# Patient Record
Sex: FEMALE | Race: BLACK OR AFRICAN AMERICAN | NOT HISPANIC OR LATINO | URBAN - METROPOLITAN AREA
[De-identification: names, ages, dates, MRNs, and addresses within clinical notes are randomized per-mention and may not be internally consistent; named-entity substitution may affect disease eponyms.]

---

## 2023-03-20 ENCOUNTER — NURSING HOME VISIT (OUTPATIENT)
Dept: POST ACUTE CARE | Facility: EXTERNAL LOCATION | Age: 88
End: 2023-03-20
Payer: MEDICARE

## 2023-03-20 ENCOUNTER — NURSING HOME VISIT (OUTPATIENT)
Dept: POST ACUTE CARE | Facility: EXTERNAL LOCATION | Age: 88
End: 2023-03-20

## 2023-03-20 DIAGNOSIS — M15.9 PRIMARY OSTEOARTHRITIS INVOLVING MULTIPLE JOINTS: ICD-10-CM

## 2023-03-20 DIAGNOSIS — K81.1 CHRONIC CHOLECYSTITIS: Primary | ICD-10-CM

## 2023-03-20 DIAGNOSIS — I10 HYPERTENSION, ESSENTIAL: ICD-10-CM

## 2023-03-20 DIAGNOSIS — F02.A0 MILD LATE ONSET ALZHEIMER'S DEMENTIA WITHOUT BEHAVIORAL DISTURBANCE, PSYCHOTIC DISTURBANCE, MOOD DISTURBANCE, OR ANXIETY (MULTI): ICD-10-CM

## 2023-03-20 DIAGNOSIS — E11.9 TYPE 2 DIABETES MELLITUS WITHOUT COMPLICATION, WITHOUT LONG-TERM CURRENT USE OF INSULIN (MULTI): ICD-10-CM

## 2023-03-20 DIAGNOSIS — R53.1 WEAKNESS: Primary | ICD-10-CM

## 2023-03-20 DIAGNOSIS — G30.1 MILD LATE ONSET ALZHEIMER'S DEMENTIA WITHOUT BEHAVIORAL DISTURBANCE, PSYCHOTIC DISTURBANCE, MOOD DISTURBANCE, OR ANXIETY (MULTI): ICD-10-CM

## 2023-03-20 DIAGNOSIS — K21.9 GASTROESOPHAGEAL REFLUX DISEASE WITHOUT ESOPHAGITIS: ICD-10-CM

## 2023-03-20 DIAGNOSIS — I50.32 CHRONIC DIASTOLIC HEART FAILURE (MULTI): ICD-10-CM

## 2023-03-20 DIAGNOSIS — N18.31 STAGE 3A CHRONIC KIDNEY DISEASE (MULTI): ICD-10-CM

## 2023-03-20 DIAGNOSIS — K81.1 CHRONIC CHOLECYSTITIS: ICD-10-CM

## 2023-03-20 DIAGNOSIS — R11.2 NAUSEA AND VOMITING IN ADULT: ICD-10-CM

## 2023-03-20 PROCEDURE — 99305 1ST NF CARE MODERATE MDM 35: CPT | Performed by: INTERNAL MEDICINE

## 2023-03-20 NOTE — LETTER
Patient: Gwen Fowler  : 1930    Encounter Date: 2023    History and Physical  Subjective  Chief complaint: Gwen Fowler is a 89 y.o. female who is a acute skilled care patient being seen and evaluated for multiple medical problems.  Patient presents for weakness    HPI:  patient admitted to SNF for therapy d/t weakness after recent hospitalization s/p fall.  During hospitalization patient had nausea vomiting and abdominal pain.  HIDA scan was performed and showed chronic cholecystitis and patient underwent laparoscopic cholecystectomy.  She had some left knee pain and underwent aspiration to rule out septic arthritis and recommendation was made for further work-up as outpatient.  She was given a left lower extremity immobilizer.  She states that she continues to have pain in multiple joints which is not new.  Pain medication is somewhat effective.  Patient has been working in therapy and is working on transfers.  She requires maximum assist for stand pivot transfer and maximum assist for sit to stand at front wheeled walker.             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  No family history of gallbladder issue  Social History     Socioeconomic History   • Marital status:      Spouse name: Not on file   • Number of children: Not on file   • Years of education: Not on file   • Highest education level: Not on file   Occupational History   • Not on file   Tobacco Use   • Smoking status: Not on file   • Smokeless tobacco: Not on file   Vaping Use   • Vaping  status: Not on file   Substance and Sexual Activity   • Alcohol use: Not on file   • Drug use: Not on file   • Sexual activity: Not on file   Other Topics Concern   • Not on file   Social History Narrative   • Not on file     Social Determinants of Health     Financial Resource Strain: Not on file   Food Insecurity: Not on file   Transportation Needs: Not on file   Physical Activity: Not on file   Stress: Not on file   Social Connections: Not on file   Intimate Partner Violence: Not on file   Housing Stability: Not on file     Social history does not smoke or drink present time  Vital signs: 140/70-75-17    Objective  Physical Exam  Vitals reviewed.   Constitutional:       Appearance: Normal appearance.   HENT:      Head: Normocephalic and atraumatic.   Cardiovascular:      Rate and Rhythm: Normal rate and regular rhythm.   Pulmonary:      Effort: Pulmonary effort is normal.      Breath sounds: Normal breath sounds.   Abdominal:      General: Bowel sounds are normal.      Palpations: Abdomen is soft.   Musculoskeletal:      Cervical back: Neck supple.      Right lower le+ Pitting Edema present.      Left lower le+ Pitting Edema present.   Skin:     General: Skin is warm and dry.   Neurological:      General: No focal deficit present.      Mental Status: She is alert.   Psychiatric:         Mood and Affect: Mood normal.         Behavior: Behavior is cooperative.         Assessment/Plan  Problem List Items Addressed This Visit          Nervous    Mild late onset Alzheimer's dementia without behavioral disturbance, psychotic disturbance, mood disturbance, or anxiety (CMS/HCC)       Circulatory    Chronic diastolic heart failure (CMS/HCC)    Hypertension, essential       Digestive    Chronic cholecystitis - Primary    Nausea and vomiting in adult    GERD (gastroesophageal reflux disease)       Genitourinary    Stage 3a chronic kidney disease       Musculoskeletal    Primary osteoarthritis involving multiple  joints     Medications, treatments, and labs reviewed  Continue medications and treatments as listed in PCC    Keely Braden MD      Electronically Signed By: Keely Braden MD   3/25/23  3:23 PM

## 2023-03-20 NOTE — LETTER
Patient: Gwen Fowler  : 1930    Encounter Date: 2023    History and Physical  Subjective  Chief complaint: Gwen Fowler is a 89 y.o. female who is a acute skilled care patient being seen and evaluated for multiple medical problems.  Patient presents for weakness    HPI:  patient admitted to SNF for therapy d/t weakness after recent hospitalization s/p fall.  During hospitalization patient had nausea vomiting and abdominal pain.  HIDA scan was performed and showed chronic cholecystitis and patient underwent laparoscopic cholecystectomy.  She had some left knee pain and underwent aspiration to rule out septic arthritis and recommendation was made for further work-up as outpatient.  She was given a left lower extremity immobilizer.  She states that she continues to have pain in multiple joints which is not new.  Pain medication is somewhat effective.  Patient has been working in therapy and is working on transfers.  She requires maximum assist for stand pivot transfer and maximum assist for sit to stand at front wheeled walker.             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  No family history of gallbladder issue  Social History     Socioeconomic History   • Marital status:      Spouse name: Not on file   • Number of children: Not on file   • Years of education: Not on file   • Highest education level: Not on file   Occupational History   • Not on file   Tobacco Use   • Smoking status: Not on file   • Smokeless tobacco: Not on file   Vaping Use   • Vaping  status: Not on file   Substance and Sexual Activity   • Alcohol use: Not on file   • Drug use: Not on file   • Sexual activity: Not on file   Other Topics Concern   • Not on file   Social History Narrative   • Not on file     Social Determinants of Health     Financial Resource Strain: Not on file   Food Insecurity: Not on file   Transportation Needs: Not on file   Physical Activity: Not on file   Stress: Not on file   Social Connections: Not on file   Intimate Partner Violence: Not on file   Housing Stability: Not on file     Social history does not smoke or drink present time  Vital signs: 140/70-75-17    Objective  Physical Exam  Vitals reviewed.   Constitutional:       Appearance: Normal appearance.   HENT:      Head: Normocephalic and atraumatic.   Cardiovascular:      Rate and Rhythm: Normal rate and regular rhythm.   Pulmonary:      Effort: Pulmonary effort is normal.      Breath sounds: Normal breath sounds.   Abdominal:      General: Bowel sounds are normal.      Palpations: Abdomen is soft.   Musculoskeletal:      Cervical back: Neck supple.      Right lower le+ Pitting Edema present.      Left lower le+ Pitting Edema present.   Skin:     General: Skin is warm and dry.   Neurological:      General: No focal deficit present.      Mental Status: She is alert.   Psychiatric:         Mood and Affect: Mood normal.         Behavior: Behavior is cooperative.         Assessment/Plan  Problem List Items Addressed This Visit          Nervous    Mild late onset Alzheimer's dementia without behavioral disturbance, psychotic disturbance, mood disturbance, or anxiety (CMS/HCC)       Circulatory    Chronic diastolic heart failure (CMS/HCC)     Diuretic  Monitor weight         Hypertension, essential     Continue antihypertensives  Continue to monitor blood pressure            Digestive    Chronic cholecystitis     Status post laparoscopic cholecystectomy            Endocrine/Metabolic    Type 2 diabetes mellitus  without complication, without long-term current use of insulin (CMS/[facility])     Monitor BS  Continue current meds            Other    Weakness - Primary     Continue therapy        Medications, treatments, and labs reviewed  Continue medications and treatments as listed in PCC    Keely Braden MD      Electronically Signed By: Keely Braden MD   3/25/23  4:52 PM

## 2023-03-21 ENCOUNTER — NURSING HOME VISIT (OUTPATIENT)
Dept: POST ACUTE CARE | Facility: EXTERNAL LOCATION | Age: 88
End: 2023-03-21
Payer: MEDICARE

## 2023-03-21 DIAGNOSIS — I10 HYPERTENSION, ESSENTIAL: Primary | ICD-10-CM

## 2023-03-21 DIAGNOSIS — F32.A DEPRESSION, UNSPECIFIED DEPRESSION TYPE: ICD-10-CM

## 2023-03-21 DIAGNOSIS — M15.9 PRIMARY OSTEOARTHRITIS INVOLVING MULTIPLE JOINTS: ICD-10-CM

## 2023-03-21 DIAGNOSIS — R53.1 WEAKNESS: ICD-10-CM

## 2023-03-21 PROCEDURE — 99308 SBSQ NF CARE LOW MDM 20: CPT | Performed by: INTERNAL MEDICINE

## 2023-03-21 NOTE — LETTER
Patient: Gwen Fowler  : 1930    Encounter Date: 2023    Subjective  Chief complaint: Gwen Fowler is a 89 y.o. female who is a acute skilled care  presents for weakness.  HPI:  Patient presents for weakness.  Patient examined today at bedside.  Patient continues working with physical and occupational therapy.  Patient denies any pain or discomfort.  Therapy reports that patient is currently working on bed mobility, transfers, and therapeutic exercises.  Staff reports no new issues.  No acute distress.        Review of Systems  All systems reviewed and negative except for what was mentioned in the HPI    Vital signs:139/75, 97.2, 73, 18    Objective  Physical Exam  Constitutional:       General: She is not in acute distress.  Eyes:      Extraocular Movements: Extraocular movements intact.   Cardiovascular:      Rate and Rhythm: Regular rhythm.   Pulmonary:      Effort: Pulmonary effort is normal.      Breath sounds: Normal breath sounds.   Abdominal:      General: Bowel sounds are normal.      Palpations: Abdomen is soft.   Musculoskeletal:      Cervical back: Neck supple.      Right lower leg: No edema.      Left lower leg: No edema.   Neurological:      Mental Status: She is alert.   Psychiatric:         Mood and Affect: Mood normal.         Behavior: Behavior is cooperative.         Assessment/Plan  Problem List Items Addressed This Visit       Depression     Mood is stable.  Continue Seroquel.  Monitor for changes in behavior.         Hypertension, essential - Primary     Continue to monitor blood pressure.  Continue antihypertensive meds.         Primary osteoarthritis involving multiple joints     Pain is controlled.  Continue PT OT.         Weakness     Positive for weakness.  Continue therapy.          Medications, treatments, and labs reviewed  Continue medications and treatments as listed in [facility]    Scribe Attestation  I, Noe fox   attest that this documentation has been  prepared under the direction and in the presence of Keely Braden MD.    Provider Attestation - Scribe documentation  All medical record entries made by the Scribe were at my direction and personally dictated by me. I have reviewed the chart and agree that the record accurately reflects my personal performance of the history, physical exam, discussion and plan.        Electronically Signed By: Keely Braden MD   3/30/23  6:24 PM

## 2023-03-22 ENCOUNTER — NURSING HOME VISIT (OUTPATIENT)
Dept: POST ACUTE CARE | Facility: EXTERNAL LOCATION | Age: 88
End: 2023-03-22
Payer: MEDICARE

## 2023-03-22 DIAGNOSIS — R53.1 WEAKNESS: Primary | ICD-10-CM

## 2023-03-22 DIAGNOSIS — I10 HYPERTENSION, ESSENTIAL: ICD-10-CM

## 2023-03-22 DIAGNOSIS — I50.32 CHRONIC DIASTOLIC HEART FAILURE (MULTI): ICD-10-CM

## 2023-03-22 DIAGNOSIS — G30.1 MILD LATE ONSET ALZHEIMER'S DEMENTIA WITHOUT BEHAVIORAL DISTURBANCE, PSYCHOTIC DISTURBANCE, MOOD DISTURBANCE, OR ANXIETY (MULTI): ICD-10-CM

## 2023-03-22 DIAGNOSIS — N18.31 STAGE 3A CHRONIC KIDNEY DISEASE (MULTI): ICD-10-CM

## 2023-03-22 DIAGNOSIS — K81.1 CHRONIC CHOLECYSTITIS: ICD-10-CM

## 2023-03-22 DIAGNOSIS — F02.A0 MILD LATE ONSET ALZHEIMER'S DEMENTIA WITHOUT BEHAVIORAL DISTURBANCE, PSYCHOTIC DISTURBANCE, MOOD DISTURBANCE, OR ANXIETY (MULTI): ICD-10-CM

## 2023-03-22 PROBLEM — E11.9 TYPE 2 DIABETES MELLITUS WITHOUT COMPLICATION, WITHOUT LONG-TERM CURRENT USE OF INSULIN (MULTI): Status: ACTIVE | Noted: 2023-03-22

## 2023-03-22 PROBLEM — M15.9 PRIMARY OSTEOARTHRITIS INVOLVING MULTIPLE JOINTS: Status: ACTIVE | Noted: 2023-03-22

## 2023-03-22 PROCEDURE — 99309 SBSQ NF CARE MODERATE MDM 30: CPT | Performed by: NURSE PRACTITIONER

## 2023-03-22 NOTE — PROGRESS NOTES
Progress Note  Subjective   Chief complaint: Gwen Fowler is a 89 y.o. female who is a acute skilled care patient being seen and evaluated for weakness    HPI:  3/22/23 patient admitted to SNF for therapy d/t weakness after recent hospitalization s/p fall.  During hospitalization patient had nausea vomiting and abdominal pain.  HIDA scan was performed and showed chronic cholecystitis and patient underwent laparoscopic cholecystectomy.  She had some left knee pain and underwent aspiration to rule out septic arthritis and recommendation was made for further work-up as outpatient.  She was given a left lower extremity immobilizer.  She states that she continues to have pain in multiple joints which is not new.  Pain medication is somewhat effective.  Patient has been working in therapy and is working on transfers.  She requires maximum assist for stand pivot transfer and maximum assist for sit to stand at front wheeled walker.      Objective   Vital signs: 133/79, 98.0, 83, 18, 96%  Physical Exam  Constitutional:       General: She is not in acute distress.  Eyes:      Extraocular Movements: Extraocular movements intact.   Cardiovascular:      Rate and Rhythm: Regular rhythm.   Pulmonary:      Effort: Pulmonary effort is normal.      Breath sounds: Normal breath sounds.   Abdominal:      General: Bowel sounds are normal.      Palpations: Abdomen is soft.      Comments: Abdomen tender to palpation    Musculoskeletal:      Cervical back: Neck supple.      Right lower leg: Edema present.      Left lower leg: Edema present.      Comments: Generalized weakness  LLE immobilizer    Skin:     Comments: Lap site at umbilicus with small dried blood   Neurological:      Mental Status: She is alert.   Psychiatric:         Mood and Affect: Mood normal.         Behavior: Behavior is cooperative.         Assessment/Plan   Problem List Items Addressed This Visit       Chronic cholecystitis     Status post laparoscopic  cholecystectomy         Chronic diastolic heart failure (CMS/[facility])     Diuretic  Monitor weight         Hypertension, essential     Controlled  Continue antihypertensives  Continue to monitor blood pressure         Mild late onset Alzheimer's dementia without behavioral disturbance, psychotic disturbance, mood disturbance, or anxiety (CMS/[facility])    Stage 3a chronic kidney disease     Monitor lab         Weakness - Primary     Continue therapy          Medications, treatments, and labs reviewed  Continue medications and treatments as listed in PCC    Tami Ga, APRN-CNP

## 2023-03-22 NOTE — LETTER
Patient: Gwen Fowler  : 1930    Encounter Date: 2023    Progress Note  Subjective  Chief complaint: Gwen Fowler is a 89 y.o. female who is a acute skilled care patient being seen and evaluated for weakness    HPI:  3/22/23 patient admitted to SNF for therapy d/t weakness after recent hospitalization s/p fall.  During hospitalization patient had nausea vomiting and abdominal pain.  HIDA scan was performed and showed chronic cholecystitis and patient underwent laparoscopic cholecystectomy.  She had some left knee pain and underwent aspiration to rule out septic arthritis and recommendation was made for further work-up as outpatient.  She was given a left lower extremity immobilizer.  She states that she continues to have pain in multiple joints which is not new.  Pain medication is somewhat effective.  Patient has been working in therapy and is working on transfers.  She requires maximum assist for stand pivot transfer and maximum assist for sit to stand at front wheeled walker.      Objective  Vital signs: 133/79, 98.0, 83, 18, 96%  Physical Exam  Constitutional:       General: She is not in acute distress.  Eyes:      Extraocular Movements: Extraocular movements intact.   Cardiovascular:      Rate and Rhythm: Regular rhythm.   Pulmonary:      Effort: Pulmonary effort is normal.      Breath sounds: Normal breath sounds.   Abdominal:      General: Bowel sounds are normal.      Palpations: Abdomen is soft.      Comments: Abdomen tender to palpation    Musculoskeletal:      Cervical back: Neck supple.      Right lower leg: Edema present.      Left lower leg: Edema present.      Comments: Generalized weakness  LLE immobilizer    Skin:     Comments: Lap site at umbilicus with small dried blood   Neurological:      Mental Status: She is alert.   Psychiatric:         Mood and Affect: Mood normal.         Behavior: Behavior is cooperative.         Assessment/Plan  Problem List Items Addressed This Visit        Chronic cholecystitis     Status post laparoscopic cholecystectomy         Chronic diastolic heart failure (CMS/[facility])     Diuretic  Monitor weight         Hypertension, essential     Controlled  Continue antihypertensives  Continue to monitor blood pressure         Mild late onset Alzheimer's dementia without behavioral disturbance, psychotic disturbance, mood disturbance, or anxiety (CMS/[facility])    Stage 3a chronic kidney disease     Monitor lab         Weakness - Primary     Continue therapy          Medications, treatments, and labs reviewed  Continue medications and treatments as listed in PCC    KAIDEN Banda      Electronically Signed By: KAIDEN Banda   3/22/23  1:16 PM

## 2023-03-23 ENCOUNTER — NURSING HOME VISIT (OUTPATIENT)
Dept: POST ACUTE CARE | Facility: EXTERNAL LOCATION | Age: 88
End: 2023-03-23
Payer: MEDICARE

## 2023-03-23 DIAGNOSIS — E11.9 TYPE 2 DIABETES MELLITUS WITHOUT COMPLICATION, WITHOUT LONG-TERM CURRENT USE OF INSULIN (MULTI): ICD-10-CM

## 2023-03-23 DIAGNOSIS — R53.1 WEAKNESS: ICD-10-CM

## 2023-03-23 PROCEDURE — 99308 SBSQ NF CARE LOW MDM 20: CPT | Performed by: INTERNAL MEDICINE

## 2023-03-23 NOTE — PROGRESS NOTES
Progress Note  Subjective   Chief complaint: Gwen Fowler is a 89 y.o. female who is a acute skilled care patient being seen and evaluated for weakness    HPI:  3/22/23 patient admitted to SNF for therapy d/t weakness after recent hospitalization s/p fall.  During hospitalization patient had nausea vomiting and abdominal pain.  HIDA scan was performed and showed chronic cholecystitis and patient underwent laparoscopic cholecystectomy.  She had some left knee pain and underwent aspiration to rule out septic arthritis and recommendation was made for further work-up as outpatient.  She was given a left lower extremity immobilizer.  She states that she continues to have pain in multiple joints which is not new.  Pain medication is somewhat effective.  Patient has been working in therapy and is working on transfers.  She requires maximum assist for stand pivot transfer and maximum assist for sit to stand at front wheeled walker.    3/23/23 patient continues to work in therapy and is working on safe transfers.  She requires maximum assist for transfers maximum assist for sit to stand at front wheeled walker.  No new concerns or complaints.  No acute distress.      Objective   Vital signs: 134/75, 90, 20, Bs 83, 96%  Physical Exam  Constitutional:       General: She is not in acute distress.  Eyes:      Extraocular Movements: Extraocular movements intact.   Cardiovascular:      Rate and Rhythm: Regular rhythm.   Pulmonary:      Effort: Pulmonary effort is normal.      Breath sounds: Normal breath sounds.   Abdominal:      General: Bowel sounds are normal.      Palpations: Abdomen is soft.      Comments: Abdomen tender to palpation    Musculoskeletal:      Cervical back: Neck supple.      Right lower leg: Edema present.      Left lower leg: Edema present.      Comments: Generalized weakness  LLE immobilizer    Skin:     Comments: Lap site at umbilicus with small dried blood   Neurological:      Mental Status: She is  alert.   Psychiatric:         Mood and Affect: Mood normal.         Behavior: Behavior is cooperative.         Assessment/Plan   Problem List Items Addressed This Visit          Endocrine/Metabolic    Type 2 diabetes mellitus without complication, without long-term current use of insulin (CMS/[facility])     Monitor BS  Continue current meds            Other    Weakness     Continue therapy        Medications, treatments, and labs reviewed  Continue medications and treatments as listed in PCC    Keely Braden MD    Review of Systems

## 2023-03-23 NOTE — LETTER
Patient: Gwen Fowler  : 1930    Encounter Date: 2023    Progress Note  Subjective  Chief complaint: Gwen Fowler is a 89 y.o. female who is a acute skilled care patient being seen and evaluated for weakness    HPI:  3/22/23 patient admitted to SNF for therapy d/t weakness after recent hospitalization s/p fall.  During hospitalization patient had nausea vomiting and abdominal pain.  HIDA scan was performed and showed chronic cholecystitis and patient underwent laparoscopic cholecystectomy.  She had some left knee pain and underwent aspiration to rule out septic arthritis and recommendation was made for further work-up as outpatient.  She was given a left lower extremity immobilizer.  She states that she continues to have pain in multiple joints which is not new.  Pain medication is somewhat effective.  Patient has been working in therapy and is working on transfers.  She requires maximum assist for stand pivot transfer and maximum assist for sit to stand at front wheeled walker.    3/23/23 patient continues to work in therapy and is working on safe transfers.  She requires maximum assist for transfers maximum assist for sit to stand at front wheeled walker.  No new concerns or complaints.  No acute distress.      Objective  Vital signs: 134/75, 90, 20, Bs 83, 96%  Physical Exam  Constitutional:       General: She is not in acute distress.  Eyes:      Extraocular Movements: Extraocular movements intact.   Cardiovascular:      Rate and Rhythm: Regular rhythm.   Pulmonary:      Effort: Pulmonary effort is normal.      Breath sounds: Normal breath sounds.   Abdominal:      General: Bowel sounds are normal.      Palpations: Abdomen is soft.      Comments: Abdomen tender to palpation    Musculoskeletal:      Cervical back: Neck supple.      Right lower leg: Edema present.      Left lower leg: Edema present.      Comments: Generalized weakness  LLE immobilizer    Skin:     Comments: Lap site at umbilicus  with small dried blood   Neurological:      Mental Status: She is alert.   Psychiatric:         Mood and Affect: Mood normal.         Behavior: Behavior is cooperative.         Assessment/Plan  Problem List Items Addressed This Visit          Endocrine/Metabolic    Type 2 diabetes mellitus without complication, without long-term current use of insulin (CMS/[facility])     Monitor BS  Continue current meds            Other    Weakness     Continue therapy        Medications, treatments, and labs reviewed  Continue medications and treatments as listed in PCC    Keely Braden MD    Review of Systems      Electronically Signed By: Keely Braden MD   3/23/23  7:20 PM

## 2023-03-24 ENCOUNTER — NURSING HOME VISIT (OUTPATIENT)
Dept: POST ACUTE CARE | Facility: EXTERNAL LOCATION | Age: 88
End: 2023-03-24
Payer: MEDICARE

## 2023-03-24 DIAGNOSIS — K21.9 GASTROESOPHAGEAL REFLUX DISEASE WITHOUT ESOPHAGITIS: ICD-10-CM

## 2023-03-24 DIAGNOSIS — I10 HYPERTENSION, ESSENTIAL: ICD-10-CM

## 2023-03-24 DIAGNOSIS — R11.2 NAUSEA AND VOMITING IN ADULT: ICD-10-CM

## 2023-03-24 DIAGNOSIS — I50.32 CHRONIC DIASTOLIC HEART FAILURE (MULTI): ICD-10-CM

## 2023-03-24 DIAGNOSIS — K81.1 CHRONIC CHOLECYSTITIS: ICD-10-CM

## 2023-03-24 DIAGNOSIS — E11.9 TYPE 2 DIABETES MELLITUS WITHOUT COMPLICATION, WITHOUT LONG-TERM CURRENT USE OF INSULIN (MULTI): ICD-10-CM

## 2023-03-24 DIAGNOSIS — R53.1 WEAKNESS: Primary | ICD-10-CM

## 2023-03-24 PROCEDURE — 99309 SBSQ NF CARE MODERATE MDM 30: CPT | Performed by: NURSE PRACTITIONER

## 2023-03-24 NOTE — LETTER
Patient: Gwen Fowler  : 1930    Encounter Date: 2023    Progress Note  Subjective  Chief complaint: Gwen Fowler is a 89 y.o. female who is a acute skilled care patient being seen and evaluated for weakness and nausea and vomiting    HPI:  3/22/23 patient admitted to SNF for therapy d/t weakness after recent hospitalization s/p fall.  During hospitalization patient had nausea vomiting and abdominal pain.  HIDA scan was performed and showed chronic cholecystitis and patient underwent laparoscopic cholecystectomy.  She had some left knee pain and underwent aspiration to rule out septic arthritis and recommendation was made for further work-up as outpatient.  She was given a left lower extremity immobilizer.  She states that she continues to have pain in multiple joints which is not new.  Pain medication is somewhat effective.  Patient has been working in therapy and is working on transfers.  She requires maximum assist for stand pivot transfer and maximum assist for sit to stand at front wheeled walker.    3/23/23 patient continues to work in therapy and is working on safe transfers.  She requires maximum assist for transfers maximum assist for sit to stand at front wheeled walker.  No new concerns or complaints.  No acute distress.    3/24/2023 patient continues working in therapy.  She requires moderate assist for sit to stand transfers.  She is working in speech therapy as well.  Nurse reporting patient with nausea and vomiting after meals.  She was followed by GI in the hospital and treated with IV Protonix and IV Zofran.  Patient is currently on PPI.  She is having bowel movements according to the nurse.      Objective  Vital signs: 154/77, 97.3, 81, 96%, 20  Physical Exam  Constitutional:       General: She is not in acute distress.  Eyes:      Extraocular Movements: Extraocular movements intact.   Cardiovascular:      Rate and Rhythm: Regular rhythm.   Pulmonary:      Effort: Pulmonary  effort is normal.      Breath sounds: Normal breath sounds.   Abdominal:      General: Bowel sounds are normal.      Palpations: Abdomen is soft.      Comments: Abdomen tender to palpation    Musculoskeletal:      Cervical back: Neck supple.      Right lower leg: Edema present.      Left lower leg: Edema present.      Comments: Generalized weakness  LLE immobilizer    Skin:     Comments: Lap site at umbilicus with small dried blood   Neurological:      Mental Status: She is alert.   Psychiatric:         Mood and Affect: Mood normal.         Behavior: Behavior is cooperative.         Assessment/Plan  Problem List Items Addressed This Visit       Chronic cholecystitis     Status post laparoscopic cholecystectomy         Chronic diastolic heart failure (CMS/HCC)     Diuretic  Monitor weight         GERD (gastroesophageal reflux disease)     Continue PPI and start Pepcid         Hypertension, essential     Continue antihypertensives  Continue to monitor blood pressure         Nausea and vomiting in adult     Zofran prn  Obtain KUB BMP CBC           Type 2 diabetes mellitus without complication, without long-term current use of insulin (CMS/HCC)    Weakness - Primary     Continue therapy        Medications, treatments, and labs reviewed  Continue medications and treatments as listed in [facility]    KAIDEN Banda        Electronically Signed By: KAIDEN Banda   3/24/23  6:30 PM

## 2023-03-24 NOTE — PROGRESS NOTES
Progress Note  Subjective   Chief complaint: Gwen Fowler is a 89 y.o. female who is a acute skilled care patient being seen and evaluated for weakness and nausea and vomiting    HPI:  3/22/23 patient admitted to SNF for therapy d/t weakness after recent hospitalization s/p fall.  During hospitalization patient had nausea vomiting and abdominal pain.  HIDA scan was performed and showed chronic cholecystitis and patient underwent laparoscopic cholecystectomy.  She had some left knee pain and underwent aspiration to rule out septic arthritis and recommendation was made for further work-up as outpatient.  She was given a left lower extremity immobilizer.  She states that she continues to have pain in multiple joints which is not new.  Pain medication is somewhat effective.  Patient has been working in therapy and is working on transfers.  She requires maximum assist for stand pivot transfer and maximum assist for sit to stand at front wheeled walker.    3/23/23 patient continues to work in therapy and is working on safe transfers.  She requires maximum assist for transfers maximum assist for sit to stand at front wheeled walker.  No new concerns or complaints.  No acute distress.    3/24/2023 patient continues working in therapy.  She requires moderate assist for sit to stand transfers.  She is working in speech therapy as well.  Nurse reporting patient with nausea and vomiting after meals.  She was followed by GI in the hospital and treated with IV Protonix and IV Zofran.  Patient is currently on PPI.  She is having bowel movements according to the nurse.      Objective   Vital signs: 154/77, 97.3, 81, 96%, 20  Physical Exam  Constitutional:       General: She is not in acute distress.  Eyes:      Extraocular Movements: Extraocular movements intact.   Cardiovascular:      Rate and Rhythm: Regular rhythm.   Pulmonary:      Effort: Pulmonary effort is normal.      Breath sounds: Normal breath sounds.   Abdominal:       General: Bowel sounds are normal.      Palpations: Abdomen is soft.      Comments: Abdomen tender to palpation    Musculoskeletal:      Cervical back: Neck supple.      Right lower leg: Edema present.      Left lower leg: Edema present.      Comments: Generalized weakness  LLE immobilizer    Skin:     Comments: Lap site at umbilicus with small dried blood   Neurological:      Mental Status: She is alert.   Psychiatric:         Mood and Affect: Mood normal.         Behavior: Behavior is cooperative.         Assessment/Plan   Problem List Items Addressed This Visit       Chronic cholecystitis     Status post laparoscopic cholecystectomy         Chronic diastolic heart failure (CMS/HCC)     Diuretic  Monitor weight         GERD (gastroesophageal reflux disease)     Continue PPI and start Pepcid         Hypertension, essential     Continue antihypertensives  Continue to monitor blood pressure         Nausea and vomiting in adult     Zofran prn  Obtain KUB BMP CBC           Type 2 diabetes mellitus without complication, without long-term current use of insulin (CMS/HCC)    Weakness - Primary     Continue therapy        Medications, treatments, and labs reviewed  Continue medications and treatments as listed in [facility]    Tami Ga, APRN-CNP

## 2023-03-25 LAB
ANION GAP IN SER/PLAS: 16 MMOL/L (ref 10–20)
CALCIUM (MG/DL) IN SER/PLAS: 7.3 MG/DL (ref 8.6–10.3)
CARBON DIOXIDE, TOTAL (MMOL/L) IN SER/PLAS: 23 MMOL/L (ref 21–32)
CHLORIDE (MMOL/L) IN SER/PLAS: 107 MMOL/L (ref 98–107)
CREATININE (MG/DL) IN SER/PLAS: 1.07 MG/DL (ref 0.5–1.05)
ERYTHROCYTE DISTRIBUTION WIDTH (RATIO) BY AUTOMATED COUNT: 14.3 % (ref 11.5–14.5)
ERYTHROCYTE MEAN CORPUSCULAR HEMOGLOBIN CONCENTRATION (G/DL) BY AUTOMATED: 29.4 G/DL (ref 32–36)
ERYTHROCYTE MEAN CORPUSCULAR VOLUME (FL) BY AUTOMATED COUNT: 97 FL (ref 80–100)
ERYTHROCYTES (10*6/UL) IN BLOOD BY AUTOMATED COUNT: 2.61 X10E12/L (ref 4–5.2)
GFR FEMALE: 48 ML/MIN/1.73M2
GLUCOSE (MG/DL) IN SER/PLAS: 41 MG/DL (ref 74–99)
HEMATOCRIT (%) IN BLOOD BY AUTOMATED COUNT: 25.2 % (ref 36–46)
HEMOGLOBIN (G/DL) IN BLOOD: 7.4 G/DL (ref 12–16)
LEUKOCYTES (10*3/UL) IN BLOOD BY AUTOMATED COUNT: 7.9 X10E9/L (ref 4.4–11.3)
PLATELETS (10*3/UL) IN BLOOD AUTOMATED COUNT: 348 X10E9/L (ref 150–450)
POTASSIUM (MMOL/L) IN SER/PLAS: 3.6 MMOL/L (ref 3.5–5.3)
SODIUM (MMOL/L) IN SER/PLAS: 142 MMOL/L (ref 136–145)
UREA NITROGEN (MG/DL) IN SER/PLAS: 18 MG/DL (ref 6–23)

## 2023-03-25 NOTE — PROGRESS NOTES
History and Physical  Subjective   Chief complaint: Gwen Fowler is a 89 y.o. female who is a acute skilled care patient being seen and evaluated for multiple medical problems.  Patient presents for weakness    HPI:  patient admitted to SNF for therapy d/t weakness after recent hospitalization s/p fall.  During hospitalization patient had nausea vomiting and abdominal pain.  HIDA scan was performed and showed chronic cholecystitis and patient underwent laparoscopic cholecystectomy.  She had some left knee pain and underwent aspiration to rule out septic arthritis and recommendation was made for further work-up as outpatient.  She was given a left lower extremity immobilizer.  She states that she continues to have pain in multiple joints which is not new.  Pain medication is somewhat effective.  Patient has been working in therapy and is working on transfers.  She requires maximum assist for stand pivot transfer and maximum assist for sit to stand at front wheeled walker.             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  No family history of gallbladder issue  Social History     Socioeconomic History    Marital status:      Spouse name: Not on file    Number of children: Not on file    Years of education: Not on file    Highest education level: Not on file   Occupational History    Not on file   Tobacco Use    Smoking status: Not on file    Smokeless tobacco: Not on file   Vaping Use    Vaping status: Not on file   Substance and Sexual Activity    Alcohol use: Not on file    Drug  use: Not on file    Sexual activity: Not on file   Other Topics Concern    Not on file   Social History Narrative    Not on file     Social Determinants of Health     Financial Resource Strain: Not on file   Food Insecurity: Not on file   Transportation Needs: Not on file   Physical Activity: Not on file   Stress: Not on file   Social Connections: Not on file   Intimate Partner Violence: Not on file   Housing Stability: Not on file     Social history does not smoke or drink present time  Vital signs: 140/70-75-17    Objective   Physical Exam  Vitals reviewed.   Constitutional:       Appearance: Normal appearance.   HENT:      Head: Normocephalic and atraumatic.   Cardiovascular:      Rate and Rhythm: Normal rate and regular rhythm.   Pulmonary:      Effort: Pulmonary effort is normal.      Breath sounds: Normal breath sounds.   Abdominal:      General: Bowel sounds are normal.      Palpations: Abdomen is soft.   Musculoskeletal:      Cervical back: Neck supple.      Right lower le+ Pitting Edema present.      Left lower le+ Pitting Edema present.   Skin:     General: Skin is warm and dry.   Neurological:      General: No focal deficit present.      Mental Status: She is alert.   Psychiatric:         Mood and Affect: Mood normal.         Behavior: Behavior is cooperative.         Assessment/Plan   Problem List Items Addressed This Visit          Nervous    Mild late onset Alzheimer's dementia without behavioral disturbance, psychotic disturbance, mood disturbance, or anxiety (CMS/HCC)       Circulatory    Chronic diastolic heart failure (CMS/HCC)     Diuretic  Monitor weight         Hypertension, essential     Continue antihypertensives  Continue to monitor blood pressure            Digestive    Chronic cholecystitis     Status post laparoscopic cholecystectomy            Endocrine/Metabolic    Type 2 diabetes mellitus without complication, without long-term current use of insulin (CMS/HCC)     Monitor  BS  Continue current meds            Other    Weakness - Primary     Continue therapy        Medications, treatments, and labs reviewed  Continue medications and treatments as listed in [facility]    Keely Braden MD

## 2023-03-25 NOTE — PROGRESS NOTES
History and Physical  Subjective   Chief complaint: Gwen Fowler is a 89 y.o. female who is a acute skilled care patient being seen and evaluated for multiple medical problems.  Patient presents for weakness    HPI:  patient admitted to SNF for therapy d/t weakness after recent hospitalization s/p fall.  During hospitalization patient had nausea vomiting and abdominal pain.  HIDA scan was performed and showed chronic cholecystitis and patient underwent laparoscopic cholecystectomy.  She had some left knee pain and underwent aspiration to rule out septic arthritis and recommendation was made for further work-up as outpatient.  She was given a left lower extremity immobilizer.  She states that she continues to have pain in multiple joints which is not new.  Pain medication is somewhat effective.  Patient has been working in therapy and is working on transfers.  She requires maximum assist for stand pivot transfer and maximum assist for sit to stand at front wheeled walker.             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  No family history of gallbladder issue  Social History     Socioeconomic History    Marital status:      Spouse name: Not on file    Number of children: Not on file    Years of education: Not on file    Highest education level: Not on file   Occupational History    Not on file   Tobacco Use    Smoking status: Not on file    Smokeless tobacco: Not on file   Vaping Use    Vaping status: Not on file   Substance and Sexual Activity    Alcohol use: Not on file    Drug  use: Not on file    Sexual activity: Not on file   Other Topics Concern    Not on file   Social History Narrative    Not on file     Social Determinants of Health     Financial Resource Strain: Not on file   Food Insecurity: Not on file   Transportation Needs: Not on file   Physical Activity: Not on file   Stress: Not on file   Social Connections: Not on file   Intimate Partner Violence: Not on file   Housing Stability: Not on file     Social history does not smoke or drink present time  Vital signs: 140/70-75-17    Objective   Physical Exam  Vitals reviewed.   Constitutional:       Appearance: Normal appearance.   HENT:      Head: Normocephalic and atraumatic.   Cardiovascular:      Rate and Rhythm: Normal rate and regular rhythm.   Pulmonary:      Effort: Pulmonary effort is normal.      Breath sounds: Normal breath sounds.   Abdominal:      General: Bowel sounds are normal.      Palpations: Abdomen is soft.   Musculoskeletal:      Cervical back: Neck supple.      Right lower le+ Pitting Edema present.      Left lower le+ Pitting Edema present.   Skin:     General: Skin is warm and dry.   Neurological:      General: No focal deficit present.      Mental Status: She is alert.   Psychiatric:         Mood and Affect: Mood normal.         Behavior: Behavior is cooperative.         Assessment/Plan   Problem List Items Addressed This Visit          Nervous    Mild late onset Alzheimer's dementia without behavioral disturbance, psychotic disturbance, mood disturbance, or anxiety (CMS/HCC)       Circulatory    Chronic diastolic heart failure (CMS/HCC)    Hypertension, essential       Digestive    Chronic cholecystitis - Primary    Nausea and vomiting in adult    GERD (gastroesophageal reflux disease)       Genitourinary    Stage 3a chronic kidney disease       Musculoskeletal    Primary osteoarthritis involving multiple joints     Medications, treatments, and labs reviewed  Continue medications and treatments as  listed in [facility]    Keely Braden MD

## 2023-03-27 ENCOUNTER — NURSING HOME VISIT (OUTPATIENT)
Dept: POST ACUTE CARE | Facility: EXTERNAL LOCATION | Age: 88
End: 2023-03-27
Payer: MEDICARE

## 2023-03-27 DIAGNOSIS — R53.1 WEAKNESS: ICD-10-CM

## 2023-03-27 DIAGNOSIS — D64.9 ANEMIA, UNSPECIFIED TYPE: ICD-10-CM

## 2023-03-27 DIAGNOSIS — R63.4 WEIGHT LOSS: ICD-10-CM

## 2023-03-27 DIAGNOSIS — R11.2 NAUSEA AND VOMITING IN ADULT: ICD-10-CM

## 2023-03-27 DIAGNOSIS — E11.9 TYPE 2 DIABETES MELLITUS WITHOUT COMPLICATION, WITHOUT LONG-TERM CURRENT USE OF INSULIN (MULTI): ICD-10-CM

## 2023-03-27 PROCEDURE — 99308 SBSQ NF CARE LOW MDM 20: CPT | Performed by: INTERNAL MEDICINE

## 2023-03-27 NOTE — PROGRESS NOTES
Subjective   Chief complaint: Gwen Fowler is a 89 y.o. female who is a acute skilled care patient being seen and evaluated for weakness, weight loss, anemia, DM    HPI:  3/22/23 patient admitted to SNF for therapy d/t weakness after recent hospitalization s/p fall.  During hospitalization patient had nausea vomiting and abdominal pain.  HIDA scan was performed and showed chronic cholecystitis and patient underwent laparoscopic cholecystectomy.  She had some left knee pain and underwent aspiration to rule out septic arthritis and recommendation was made for further work-up as outpatient.  She was given a left lower extremity immobilizer.  She states that she continues to have pain in multiple joints which is not new.  Pain medication is somewhat effective.  Patient has been working in therapy and is working on transfers.  She requires maximum assist for stand pivot transfer and maximum assist for sit to stand at front wheeled walker.    3/23/23 patient continues to work in therapy and is working on safe transfers.  She requires maximum assist for transfers maximum assist for sit to stand at front wheeled walker.  No new concerns or complaints.  No acute distress.    3/24/2023 patient continues working in therapy.  She requires moderate assist for sit to stand transfers.  She is working in speech therapy as well.  Nurse reporting patient with nausea and vomiting after meals.  She was followed by GI in the hospital and treated with IV Protonix and IV Zofran.  Patient is currently on PPI.  She is having bowel movements according to the nurse.    3/27/23 Patient with Dementia continues to work in speech, physical and occupational therapy. She requires moderate assistance for moderate assistance for transfers. Nurse reports that she has had weight loss and dietician will evaluate.  Presently denies nausea or vomiting, fever, chills or diarrhea. In addition her BS was low at 42, she takes metformin once daily for DM.  BMP was obtained and revealed H&H 7.4 and 12.5. No other concerns today.         Review of Systems  All systems reviewed and negative except for what was mentioned in the HPI    Vital signs: 120/78, 94, 20, 94%, BS 83    Objective   Physical Exam  Constitutional:       General: She is not in acute distress.     Comments: +weight loss   Eyes:      Extraocular Movements: Extraocular movements intact.   Cardiovascular:      Rate and Rhythm: Regular rhythm.   Pulmonary:      Effort: Pulmonary effort is normal.      Breath sounds: Normal breath sounds.   Abdominal:      General: Bowel sounds are normal.      Palpations: Abdomen is soft.   Musculoskeletal:      Cervical back: Neck supple.      Right lower leg: No edema.      Left lower leg: No edema.   Neurological:      Mental Status: She is alert.   Psychiatric:         Mood and Affect: Mood normal.         Behavior: Behavior is cooperative.         Assessment/Plan   Problem List Items Addressed This Visit          Digestive    Nausea and vomiting in adult     Improved  Continue Zofran prn  Pending -  KUB BMP CBC               Endocrine/Metabolic    Type 2 diabetes mellitus without complication, without long-term current use of insulin (CMS/[facility])     Monitor BS  Continue current meds         Weight loss     Dietician to eval and treat as needed            Hematologic    Anemia     FE  Monitor labs            Other    Weakness     Continue therapy          Medications, treatments, and labs reviewed  Continue medications and treatments as listed in [facility]    Scribe Attestation  By signing my name below, I, Noe Zamora   attest that this documentation has been prepared under the direction and in the presence of Keely Braden MD.    Provider Attestation - Scribe documentation  All medical record entries made by the Scribe were at my direction and personally dictated by me. I have reviewed the chart and agree that the record accurately reflects my personal  performance of the history, physical exam, discussion and plan.

## 2023-03-27 NOTE — LETTER
Patient: Gwen Fowler  : 1930    Encounter Date: 2023    Subjective  Chief complaint: Gwen Fowler is a 89 y.o. female who is a acute skilled care patient being seen and evaluated for weakness, weight loss, anemia, DM    HPI:  3/22/23 patient admitted to SNF for therapy d/t weakness after recent hospitalization s/p fall.  During hospitalization patient had nausea vomiting and abdominal pain.  HIDA scan was performed and showed chronic cholecystitis and patient underwent laparoscopic cholecystectomy.  She had some left knee pain and underwent aspiration to rule out septic arthritis and recommendation was made for further work-up as outpatient.  She was given a left lower extremity immobilizer.  She states that she continues to have pain in multiple joints which is not new.  Pain medication is somewhat effective.  Patient has been working in therapy and is working on transfers.  She requires maximum assist for stand pivot transfer and maximum assist for sit to stand at front wheeled walker.    3/23/23 patient continues to work in therapy and is working on safe transfers.  She requires maximum assist for transfers maximum assist for sit to stand at front wheeled walker.  No new concerns or complaints.  No acute distress.    3/24/2023 patient continues working in therapy.  She requires moderate assist for sit to stand transfers.  She is working in speech therapy as well.  Nurse reporting patient with nausea and vomiting after meals.  She was followed by GI in the hospital and treated with IV Protonix and IV Zofran.  Patient is currently on PPI.  She is having bowel movements according to the nurse.    3/27/23 Patient with Dementia continues to work in speech, physical and occupational therapy. She requires moderate assistance for moderate assistance for transfers. Nurse reports that she has had weight loss and dietician will evaluate.  Presently denies nausea or vomiting, fever, chills or diarrhea. In  addition her BS was low at 42, she takes metformin once daily for DM. BMP was obtained and revealed H&H 7.4 and 12.5. No other concerns today.         Review of Systems  All systems reviewed and negative except for what was mentioned in the HPI    Vital signs: 120/78, 94, 20, 94%, BS 83    Objective  Physical Exam  Constitutional:       General: She is not in acute distress.     Comments: +weight loss   Eyes:      Extraocular Movements: Extraocular movements intact.   Cardiovascular:      Rate and Rhythm: Regular rhythm.   Pulmonary:      Effort: Pulmonary effort is normal.      Breath sounds: Normal breath sounds.   Abdominal:      General: Bowel sounds are normal.      Palpations: Abdomen is soft.   Musculoskeletal:      Cervical back: Neck supple.      Right lower leg: No edema.      Left lower leg: No edema.   Neurological:      Mental Status: She is alert.   Psychiatric:         Mood and Affect: Mood normal.         Behavior: Behavior is cooperative.         Assessment/Plan  Problem List Items Addressed This Visit          Digestive    Nausea and vomiting in adult     Improved  Continue Zofran prn  Pending -  KUB BMP CBC               Endocrine/Metabolic    Type 2 diabetes mellitus without complication, without long-term current use of insulin (CMS/[facility])     Monitor BS  Continue current meds         Weight loss     Dietician to eval and treat as needed            Hematologic    Anemia     FE  Monitor labs            Other    Weakness     Continue therapy          Medications, treatments, and labs reviewed  Continue medications and treatments as listed in [facility]    Scribe Attestation  By signing my name below, I, Noe Zamora   attest that this documentation has been prepared under the direction and in the presence of Keely Braden MD.    Provider Attestation - Scribe documentation  All medical record entries made by the Scribe were at my direction and personally dictated by me. I have reviewed the chart  and agree that the record accurately reflects my personal performance of the history, physical exam, discussion and plan.      Electronically Signed By: Keely Braden MD   3/27/23  6:40 PM

## 2023-03-28 ENCOUNTER — NURSING HOME VISIT (OUTPATIENT)
Dept: POST ACUTE CARE | Facility: EXTERNAL LOCATION | Age: 88
End: 2023-03-28
Payer: MEDICARE

## 2023-03-28 DIAGNOSIS — I50.32 CHRONIC DIASTOLIC HEART FAILURE (MULTI): ICD-10-CM

## 2023-03-28 DIAGNOSIS — R53.1 WEAKNESS: ICD-10-CM

## 2023-03-28 PROCEDURE — 99307 SBSQ NF CARE SF MDM 10: CPT | Performed by: INTERNAL MEDICINE

## 2023-03-28 NOTE — LETTER
Patient: Gwen Fowler  : 1930    Encounter Date: 2023    Subjective  Chief complaint: Gwen Fowler is a 89 y.o. female who is a acute skilled care patient being seen and evaluated for weakness,     HPI:  3/22/23 patient admitted to SNF for therapy d/t weakness after recent hospitalization s/p fall.  During hospitalization patient had nausea vomiting and abdominal pain.  HIDA scan was performed and showed chronic cholecystitis and patient underwent laparoscopic cholecystectomy.  She had some left knee pain and underwent aspiration to rule out septic arthritis and recommendation was made for further work-up as outpatient.  She was given a left lower extremity immobilizer.  She states that she continues to have pain in multiple joints which is not new.  Pain medication is somewhat effective.  Patient has been working in therapy and is working on transfers.  She requires maximum assist for stand pivot transfer and maximum assist for sit to stand at front wheeled walker.    3/23/23 patient continues to work in therapy and is working on safe transfers.  She requires maximum assist for transfers maximum assist for sit to stand at front wheeled walker.  No new concerns or complaints.  No acute distress.    3/24/2023 patient continues working in therapy.  She requires moderate assist for sit to stand transfers.  She is working in speech therapy as well.  Nurse reporting patient with nausea and vomiting after meals.  She was followed by GI in the hospital and treated with IV Protonix and IV Zofran.  Patient is currently on PPI.  She is having bowel movements according to the nurse.    3/27/23 Patient with Dementia continues to work in speech, physical and occupational therapy. She requires moderate assistance for moderate assistance for transfers. Nurse reports that she has had weight loss and dietician will evaluate.  Presently denies nausea or vomiting, fever, chills or diarrhea. In addition her BS was  low at 42, she takes metformin once daily for DM. BMP was obtained and revealed H&H 7.4 and 12.5. No other concerns today.     3/28.23 Patient continues working in physical therapy and requires moderate assistance for transfers and ADLs.  No new issues or concerns.  No acute distress. Denies shortness of breath.        Review of Systems  All systems reviewed and negative except for what was mentioned in the HPI    Vital signs: 124/72, 20, 96%,    Objective  Physical Exam  Constitutional:       General: She is not in acute distress.     Comments: +weight loss   Eyes:      Extraocular Movements: Extraocular movements intact.   Cardiovascular:      Rate and Rhythm: Regular rhythm.   Pulmonary:      Effort: Pulmonary effort is normal.      Breath sounds: Normal breath sounds.   Abdominal:      General: Bowel sounds are normal.      Palpations: Abdomen is soft.   Musculoskeletal:      Cervical back: Neck supple.      Right lower leg: No edema.      Left lower leg: No edema.   Neurological:      Mental Status: She is alert.   Psychiatric:         Mood and Affect: Mood normal.         Behavior: Behavior is cooperative.         Assessment/Plan  Problem List Items Addressed This Visit          Circulatory    Chronic diastolic heart failure (CMS/HCC)       Other    Weakness     Continue therapy           Medications, treatments, and labs reviewed  Continue medications and treatments as listed in [facility]    Scribe Attestation  By signing my name below, IIsabel Scribe   attest that this documentation has been prepared under the direction and in the presence of Keely Braden MD.    Provider Attestation - Scribe documentation  All medical record entries made by the Scribe were at my direction and personally dictated by me. I have reviewed the chart and agree that the record accurately reflects my personal performance of the history, physical exam, discussion and plan.      Electronically Signed By: Keely Braden MD    3/29/23  5:57 PM

## 2023-03-29 ENCOUNTER — NURSING HOME VISIT (OUTPATIENT)
Dept: POST ACUTE CARE | Facility: EXTERNAL LOCATION | Age: 88
End: 2023-03-29
Payer: MEDICARE

## 2023-03-29 DIAGNOSIS — K81.1 CHRONIC CHOLECYSTITIS: ICD-10-CM

## 2023-03-29 DIAGNOSIS — R53.1 WEAKNESS: Primary | ICD-10-CM

## 2023-03-29 DIAGNOSIS — I50.32 CHRONIC DIASTOLIC HEART FAILURE (MULTI): ICD-10-CM

## 2023-03-29 DIAGNOSIS — I10 HYPERTENSION, ESSENTIAL: ICD-10-CM

## 2023-03-29 DIAGNOSIS — G30.1 MILD LATE ONSET ALZHEIMER'S DEMENTIA WITHOUT BEHAVIORAL DISTURBANCE, PSYCHOTIC DISTURBANCE, MOOD DISTURBANCE, OR ANXIETY (MULTI): ICD-10-CM

## 2023-03-29 DIAGNOSIS — F02.A0 MILD LATE ONSET ALZHEIMER'S DEMENTIA WITHOUT BEHAVIORAL DISTURBANCE, PSYCHOTIC DISTURBANCE, MOOD DISTURBANCE, OR ANXIETY (MULTI): ICD-10-CM

## 2023-03-29 PROBLEM — F32.A DEPRESSION: Status: ACTIVE | Noted: 2023-03-29

## 2023-03-29 PROCEDURE — 99309 SBSQ NF CARE MODERATE MDM 30: CPT | Performed by: NURSE PRACTITIONER

## 2023-03-29 NOTE — LETTER
Patient: Gwen Fowler  : 1930    Encounter Date: 2023    PROGRESS NOTE  Subjective  Chief complaint: Gwen Fowler is a 89 y.o. female who is an acute skilled patient being seen and evaluated for weakness    HPI:  3/22/23 patient admitted to SNF for therapy d/t weakness after recent hospitalization s/p fall.  During hospitalization patient had nausea vomiting and abdominal pain.  HIDA scan was performed and showed chronic cholecystitis and patient underwent laparoscopic cholecystectomy.  She had some left knee pain and underwent aspiration to rule out septic arthritis and recommendation was made for further work-up as outpatient.  She was given a left lower extremity immobilizer.  She states that she continues to have pain in multiple joints which is not new.  Pain medication is somewhat effective.  Patient has been working in therapy and is working on transfers.  She requires maximum assist for stand pivot transfer and maximum assist for sit to stand at front wheeled walker.    3/23/23 patient continues to work in therapy and is working on safe transfers.  She requires maximum assist for transfers maximum assist for sit to stand at front wheeled walker.  No new concerns or complaints.  No acute distress.    3/24/2023 patient continues working in therapy.  She requires moderate assist for sit to stand transfers.  She is working in speech therapy as well.  Nurse reporting patient with nausea and vomiting after meals.  She was followed by GI in the hospital and treated with IV Protonix and IV Zofran.  Patient is currently on PPI.  She is having bowel movements according to the nurse.    3/27/23 Patient with Dementia continues to work in speech, physical and occupational therapy. She requires moderate assistance for moderate assistance for transfers. Nurse reports that she has had weight loss and dietician will evaluate.  Presently denies nausea or vomiting, fever, chills or diarrhea. In addition her  BS was low at 42, she takes metformin once daily for DM. BMP was obtained and revealed H&H 7.4 and 12.5. No other concerns today.     3/29/2023 patient is working on transfers in therapy and continues to work toward goals.  She has no new concerns today.  Denies constitutional symptoms.      Objective  Vital signs: 129/80, 20, 97.5, 73, 96%    Physical Exam  Constitutional:       General: She is not in acute distress.  Eyes:      Extraocular Movements: Extraocular movements intact.   Cardiovascular:      Rate and Rhythm: Regular rhythm.   Pulmonary:      Effort: Pulmonary effort is normal.      Breath sounds: Normal breath sounds.   Abdominal:      General: Bowel sounds are normal.      Palpations: Abdomen is soft.      Comments: Abdomen tender to palpation    Genitourinary:     Comments: Bradley  Musculoskeletal:      Cervical back: Neck supple.      Right lower leg: Edema present.      Left lower leg: Edema present.      Comments: Generalized weakness     Skin:     Comments: Lap site at umbilicus with small dried blood   Neurological:      Mental Status: She is alert.   Psychiatric:         Mood and Affect: Mood normal.         Behavior: Behavior is cooperative.         Assessment/Plan  Problem List Items Addressed This Visit       Chronic cholecystitis     Status post laparoscopic cholecystectomy         Chronic diastolic heart failure (CMS/HCC)     Stable  Diuretic  Monitor weight         Hypertension, essential     Controlled  Continue antihypertensives  Continue to monitor blood pressure         Mild late onset Alzheimer's dementia without behavioral disturbance, psychotic disturbance, mood disturbance, or anxiety (CMS/HCC)    Weakness - Primary     Continue therapy          Medications, treatments, and labs reviewed  Continue medications and treatments as listed in [facility]    KAIDEN Banda      Electronically Signed By: KAIDEN Banda   3/29/23  7:47 PM

## 2023-03-29 NOTE — PROGRESS NOTES
PROGRESS NOTE  Subjective   Chief complaint: Gwen Fowler is a 89 y.o. female who is an acute skilled patient being seen and evaluated for weakness    HPI:  3/22/23 patient admitted to SNF for therapy d/t weakness after recent hospitalization s/p fall.  During hospitalization patient had nausea vomiting and abdominal pain.  HIDA scan was performed and showed chronic cholecystitis and patient underwent laparoscopic cholecystectomy.  She had some left knee pain and underwent aspiration to rule out septic arthritis and recommendation was made for further work-up as outpatient.  She was given a left lower extremity immobilizer.  She states that she continues to have pain in multiple joints which is not new.  Pain medication is somewhat effective.  Patient has been working in therapy and is working on transfers.  She requires maximum assist for stand pivot transfer and maximum assist for sit to stand at front wheeled walker.    3/23/23 patient continues to work in therapy and is working on safe transfers.  She requires maximum assist for transfers maximum assist for sit to stand at front wheeled walker.  No new concerns or complaints.  No acute distress.    3/24/2023 patient continues working in therapy.  She requires moderate assist for sit to stand transfers.  She is working in speech therapy as well.  Nurse reporting patient with nausea and vomiting after meals.  She was followed by GI in the hospital and treated with IV Protonix and IV Zofran.  Patient is currently on PPI.  She is having bowel movements according to the nurse.    3/27/23 Patient with Dementia continues to work in speech, physical and occupational therapy. She requires moderate assistance for moderate assistance for transfers. Nurse reports that she has had weight loss and dietician will evaluate.  Presently denies nausea or vomiting, fever, chills or diarrhea. In addition her BS was low at 42, she takes metformin once daily for DM. BMP was  obtained and revealed H&H 7.4 and 12.5. No other concerns today.     3/29/2023 patient is working on transfers in therapy and continues to work toward goals.  She has no new concerns today.  Denies constitutional symptoms.      Objective   Vital signs: 129/80, 20, 97.5, 73, 96%    Physical Exam  Constitutional:       General: She is not in acute distress.  Eyes:      Extraocular Movements: Extraocular movements intact.   Cardiovascular:      Rate and Rhythm: Regular rhythm.   Pulmonary:      Effort: Pulmonary effort is normal.      Breath sounds: Normal breath sounds.   Abdominal:      General: Bowel sounds are normal.      Palpations: Abdomen is soft.      Comments: Abdomen tender to palpation    Genitourinary:     Comments: Bradley  Musculoskeletal:      Cervical back: Neck supple.      Right lower leg: Edema present.      Left lower leg: Edema present.      Comments: Generalized weakness     Skin:     Comments: Lap site at umbilicus with small dried blood   Neurological:      Mental Status: She is alert.   Psychiatric:         Mood and Affect: Mood normal.         Behavior: Behavior is cooperative.         Assessment/Plan   Problem List Items Addressed This Visit       Chronic cholecystitis     Status post laparoscopic cholecystectomy         Chronic diastolic heart failure (CMS/HCC)     Stable  Diuretic  Monitor weight         Hypertension, essential     Controlled  Continue antihypertensives  Continue to monitor blood pressure         Mild late onset Alzheimer's dementia without behavioral disturbance, psychotic disturbance, mood disturbance, or anxiety (CMS/HCC)    Weakness - Primary     Continue therapy          Medications, treatments, and labs reviewed  Continue medications and treatments as listed in [facility]    MEE Banda-CNP

## 2023-03-29 NOTE — PROGRESS NOTES
Subjective   Chief complaint: Gwen Fowler is a 89 y.o. female who is a acute skilled care patient being seen and evaluated for weakness,     HPI:  3/22/23 patient admitted to SNF for therapy d/t weakness after recent hospitalization s/p fall.  During hospitalization patient had nausea vomiting and abdominal pain.  HIDA scan was performed and showed chronic cholecystitis and patient underwent laparoscopic cholecystectomy.  She had some left knee pain and underwent aspiration to rule out septic arthritis and recommendation was made for further work-up as outpatient.  She was given a left lower extremity immobilizer.  She states that she continues to have pain in multiple joints which is not new.  Pain medication is somewhat effective.  Patient has been working in therapy and is working on transfers.  She requires maximum assist for stand pivot transfer and maximum assist for sit to stand at front wheeled walker.    3/23/23 patient continues to work in therapy and is working on safe transfers.  She requires maximum assist for transfers maximum assist for sit to stand at front wheeled walker.  No new concerns or complaints.  No acute distress.    3/24/2023 patient continues working in therapy.  She requires moderate assist for sit to stand transfers.  She is working in speech therapy as well.  Nurse reporting patient with nausea and vomiting after meals.  She was followed by GI in the hospital and treated with IV Protonix and IV Zofran.  Patient is currently on PPI.  She is having bowel movements according to the nurse.    3/27/23 Patient with Dementia continues to work in speech, physical and occupational therapy. She requires moderate assistance for moderate assistance for transfers. Nurse reports that she has had weight loss and dietician will evaluate.  Presently denies nausea or vomiting, fever, chills or diarrhea. In addition her BS was low at 42, she takes metformin once daily for DM. BMP was obtained and  revealed H&H 7.4 and 12.5. No other concerns today.     3/28.23 Patient continues working in physical therapy and requires moderate assistance for transfers and ADLs.  No new issues or concerns.  No acute distress. Denies shortness of breath.        Review of Systems  All systems reviewed and negative except for what was mentioned in the HPI    Vital signs: 124/72, 20, 96%,    Objective   Physical Exam  Constitutional:       General: She is not in acute distress.     Comments: +weight loss   Eyes:      Extraocular Movements: Extraocular movements intact.   Cardiovascular:      Rate and Rhythm: Regular rhythm.   Pulmonary:      Effort: Pulmonary effort is normal.      Breath sounds: Normal breath sounds.   Abdominal:      General: Bowel sounds are normal.      Palpations: Abdomen is soft.   Musculoskeletal:      Cervical back: Neck supple.      Right lower leg: No edema.      Left lower leg: No edema.   Neurological:      Mental Status: She is alert.   Psychiatric:         Mood and Affect: Mood normal.         Behavior: Behavior is cooperative.         Assessment/Plan   Problem List Items Addressed This Visit          Circulatory    Chronic diastolic heart failure (CMS/HCC)       Other    Weakness     Continue therapy           Medications, treatments, and labs reviewed  Continue medications and treatments as listed in [facility]    Scribe Attestation  By signing my name below, IIsabel Scribe   attest that this documentation has been prepared under the direction and in the presence of Keely Braden MD.    Provider Attestation - Scribe documentation  All medical record entries made by the Scribe were at my direction and personally dictated by me. I have reviewed the chart and agree that the record accurately reflects my personal performance of the history, physical exam, discussion and plan.

## 2023-03-29 NOTE — PROGRESS NOTES
Subjective   Chief complaint: Gwen Fowler is a 89 y.o. female who is a acute skilled care  presents for weakness.  HPI:  Patient presents for weakness.  Patient examined today at bedside.  Patient continues working with physical and occupational therapy.  Patient denies any pain or discomfort.  Therapy reports that patient is currently working on bed mobility, transfers, and therapeutic exercises.  Staff reports no new issues.  No acute distress.        Review of Systems  All systems reviewed and negative except for what was mentioned in the HPI    Vital signs:139/75, 97.2, 73, 18    Objective   Physical Exam  Constitutional:       General: She is not in acute distress.  Eyes:      Extraocular Movements: Extraocular movements intact.   Cardiovascular:      Rate and Rhythm: Regular rhythm.   Pulmonary:      Effort: Pulmonary effort is normal.      Breath sounds: Normal breath sounds.   Abdominal:      General: Bowel sounds are normal.      Palpations: Abdomen is soft.   Musculoskeletal:      Cervical back: Neck supple.      Right lower leg: No edema.      Left lower leg: No edema.   Neurological:      Mental Status: She is alert.   Psychiatric:         Mood and Affect: Mood normal.         Behavior: Behavior is cooperative.         Assessment/Plan   Problem List Items Addressed This Visit       Depression     Mood is stable.  Continue Seroquel.  Monitor for changes in behavior.         Hypertension, essential - Primary     Continue to monitor blood pressure.  Continue antihypertensive meds.         Primary osteoarthritis involving multiple joints     Pain is controlled.  Continue PT OT.         Weakness     Positive for weakness.  Continue therapy.          Medications, treatments, and labs reviewed  Continue medications and treatments as listed in [facility]    Scribe Attestation  I, Christina foxibcodi   attest that this documentation has been prepared under the direction and in the presence of Keely Braden  MD.    Provider Attestation - Scribe documentation  All medical record entries made by the Scribe were at my direction and personally dictated by me. I have reviewed the chart and agree that the record accurately reflects my personal performance of the history, physical exam, discussion and plan.

## 2023-03-30 ENCOUNTER — NURSING HOME VISIT (OUTPATIENT)
Dept: POST ACUTE CARE | Facility: EXTERNAL LOCATION | Age: 88
End: 2023-03-30
Payer: MEDICARE

## 2023-03-30 DIAGNOSIS — I50.32 CHRONIC DIASTOLIC HEART FAILURE (MULTI): ICD-10-CM

## 2023-03-30 DIAGNOSIS — I10 HYPERTENSION, ESSENTIAL: ICD-10-CM

## 2023-03-30 DIAGNOSIS — R53.1 WEAKNESS: ICD-10-CM

## 2023-03-30 PROCEDURE — 99308 SBSQ NF CARE LOW MDM 20: CPT | Performed by: INTERNAL MEDICINE

## 2023-03-30 NOTE — LETTER
Patient: Gwen Fowler  : 1930    Encounter Date: 2023    Subjective  Chief complaint: Gwen Fowler is a 89 y.o. female who is a acute skilled care patient being seen and evaluated for weakness    HPI:  3/22/23 patient admitted to SNF for therapy d/t weakness after recent hospitalization s/p fall.  During hospitalization patient had nausea vomiting and abdominal pain.  HIDA scan was performed and showed chronic cholecystitis and patient underwent laparoscopic cholecystectomy.  She had some left knee pain and underwent aspiration to rule out septic arthritis and recommendation was made for further work-up as outpatient.  She was given a left lower extremity immobilizer.  She states that she continues to have pain in multiple joints which is not new.  Pain medication is somewhat effective.  Patient has been working in therapy and is working on transfers.  She requires maximum assist for stand pivot transfer and maximum assist for sit to stand at front wheeled walker.    3/23/23 patient continues to work in therapy and is working on safe transfers.  She requires maximum assist for transfers maximum assist for sit to stand at front wheeled walker.  No new concerns or complaints.  No acute distress.    3/24/2023 patient continues working in therapy.  She requires moderate assist for sit to stand transfers.  She is working in speech therapy as well.  Nurse reporting patient with nausea and vomiting after meals.  She was followed by GI in the hospital and treated with IV Protonix and IV Zofran.  Patient is currently on PPI.  She is having bowel movements according to the nurse.    3/27/23 Patient with Dementia continues to work in speech, physical and occupational therapy. She requires moderate assistance for moderate assistance for transfers. Nurse reports that she has had weight loss and dietician will evaluate.  Presently denies nausea or vomiting, fever, chills or diarrhea. In addition her BS was low  at 42, she takes metformin once daily for DM. BMP was obtained and revealed H&H 7.4 and 12.5. No other concerns today.     3/29/2023 patient is working on transfers in therapy and continues to work toward goals.  She has no new concerns today.  Denies constitutional symptoms.    3/30/23 Patient working in therapy due to weakness and debility. Patient requires moderate assistance for transfers. Denies pain at this time. No acute distress or new concerns. Denies SOB or unexplained weight gain.         Review of Systems  All systems reviewed and negative except for what was mentioned in the HPI    Vital signs: 139/72, 81, 19, 94%    Objective  Physical Exam  Constitutional:       General: She is not in acute distress.  Eyes:      Extraocular Movements: Extraocular movements intact.   Cardiovascular:      Rate and Rhythm: Regular rhythm.   Pulmonary:      Effort: Pulmonary effort is normal.      Breath sounds: Normal breath sounds.   Abdominal:      General: Bowel sounds are normal.      Palpations: Abdomen is soft.   Musculoskeletal:         General: Normal range of motion.      Cervical back: Neck supple.      Right lower leg: No edema.      Left lower leg: No edema.   Neurological:      Mental Status: She is alert.   Psychiatric:         Mood and Affect: Mood normal.         Behavior: Behavior is cooperative.         Assessment/Plan  Problem List Items Addressed This Visit          Circulatory    Chronic diastolic heart failure (CMS/HCC)     Stable  Diuretic  Monitor weight         Hypertension, essential     Continue antihypertensives  Continue to monitor blood pressure            Other    Weakness     Continue therapy          Medications, treatments, and labs reviewed  Continue medications and treatments as listed in [facility]    Scribe Attestation  By signing my name below, Isabel CHAUDHRY, Scribe   attest that this documentation has been prepared under the direction and in the presence of Keely Braden  MD.    Provider Attestation - Scribe documentation  All medical record entries made by the Scribe were at my direction and personally dictated by me. I have reviewed the chart and agree that the record accurately reflects my personal performance of the history, physical exam, discussion and plan.      Electronically Signed By: Keely Braden MD   3/31/23  7:07 PM

## 2023-03-31 ENCOUNTER — NURSING HOME VISIT (OUTPATIENT)
Dept: POST ACUTE CARE | Facility: EXTERNAL LOCATION | Age: 88
End: 2023-03-31
Payer: MEDICARE

## 2023-03-31 DIAGNOSIS — I10 HYPERTENSION, ESSENTIAL: ICD-10-CM

## 2023-03-31 DIAGNOSIS — I50.32 CHRONIC DIASTOLIC HEART FAILURE (MULTI): ICD-10-CM

## 2023-03-31 DIAGNOSIS — R53.1 WEAKNESS: Primary | ICD-10-CM

## 2023-03-31 DIAGNOSIS — G30.1 MILD LATE ONSET ALZHEIMER'S DEMENTIA WITHOUT BEHAVIORAL DISTURBANCE, PSYCHOTIC DISTURBANCE, MOOD DISTURBANCE, OR ANXIETY (MULTI): ICD-10-CM

## 2023-03-31 DIAGNOSIS — F02.A0 MILD LATE ONSET ALZHEIMER'S DEMENTIA WITHOUT BEHAVIORAL DISTURBANCE, PSYCHOTIC DISTURBANCE, MOOD DISTURBANCE, OR ANXIETY (MULTI): ICD-10-CM

## 2023-03-31 PROCEDURE — 99309 SBSQ NF CARE MODERATE MDM 30: CPT | Performed by: NURSE PRACTITIONER

## 2023-03-31 NOTE — PROGRESS NOTES
PROGRESS NOTE  Subjective   Chief complaint: Gwen Fowler is a 89 y.o. female who is an acute skilled patient being seen and evaluated for weakness    HPI:  3/22/23 patient admitted to SNF for therapy d/t weakness after recent hospitalization s/p fall.  During hospitalization patient had nausea vomiting and abdominal pain.  HIDA scan was performed and showed chronic cholecystitis and patient underwent laparoscopic cholecystectomy.  She had some left knee pain and underwent aspiration to rule out septic arthritis and recommendation was made for further work-up as outpatient.  She was given a left lower extremity immobilizer.  She states that she continues to have pain in multiple joints which is not new.  Pain medication is somewhat effective.  Patient has been working in therapy and is working on transfers.  She requires maximum assist for stand pivot transfer and maximum assist for sit to stand at front wheeled walker.    3/23/23 patient continues to work in therapy and is working on safe transfers.  She requires maximum assist for transfers maximum assist for sit to stand at front wheeled walker.  No new concerns or complaints.  No acute distress.    3/24/2023 patient continues working in therapy.  She requires moderate assist for sit to stand transfers.  She is working in speech therapy as well.  Nurse reporting patient with nausea and vomiting after meals.  She was followed by GI in the hospital and treated with IV Protonix and IV Zofran.  Patient is currently on PPI.  She is having bowel movements according to the nurse.    3/27/23 Patient with Dementia continues to work in speech, physical and occupational therapy. She requires moderate assistance for moderate assistance for transfers. Nurse reports that she has had weight loss and dietician will evaluate.  Presently denies nausea or vomiting, fever, chills or diarrhea. In addition her BS was low at 42, she takes metformin once daily for DM. BMP was  obtained and revealed H&H 7.4 and 12.5. No other concerns today.     3/29/2023 patient is working on transfers in therapy and continues to work toward goals.  She has no new concerns today.  Denies constitutional symptoms.    3/30/23 Patient working in therapy due to weakness and debility. Patient requires moderate assistance for transfers. Denies pain at this time. No acute distress or new concerns. Denies SOB or unexplained weight gain.     3/31/2023 patient with no new concerns today.  She continues to work towards goals in therapy.  Requires wheelchair for mobility.  Denies constitutional symptoms.      Objective   Vital signs: 129/72, 19, 97.5, 94%    Physical Exam  Constitutional:       General: She is not in acute distress.  Eyes:      Extraocular Movements: Extraocular movements intact.   Cardiovascular:      Rate and Rhythm: Regular rhythm.   Pulmonary:      Effort: Pulmonary effort is normal.      Breath sounds: Normal breath sounds.   Abdominal:      General: Bowel sounds are normal.      Palpations: Abdomen is soft.   Genitourinary:     Comments: Bradley  Musculoskeletal:      Cervical back: Neck supple.      Right lower leg: Edema present.      Left lower leg: Edema present.      Comments: Generalized weakness     Neurological:      Mental Status: She is alert.   Psychiatric:         Mood and Affect: Mood normal.         Behavior: Behavior is cooperative.       Assessment/Plan   Problem List Items Addressed This Visit       Chronic diastolic heart failure (CMS/HCC)     Stable  Diuretic  Monitor weight         Hypertension, essential     Controlled  Continue antihypertensives  Continue to monitor blood pressure         Mild late onset Alzheimer's dementia without behavioral disturbance, psychotic disturbance, mood disturbance, or anxiety (CMS/HCC)    Weakness - Primary     Continue therapy          Medications, treatments, and labs reviewed  Continue medications and treatments as listed in [facility]    Tami  A Ga, APRN-CNP

## 2023-03-31 NOTE — LETTER
Patient: Gwen Fowler  : 1930    Encounter Date: 2023    PROGRESS NOTE  Subjective  Chief complaint: Gwen Fowler is a 89 y.o. female who is an acute skilled patient being seen and evaluated for weakness    HPI:  3/22/23 patient admitted to SNF for therapy d/t weakness after recent hospitalization s/p fall.  During hospitalization patient had nausea vomiting and abdominal pain.  HIDA scan was performed and showed chronic cholecystitis and patient underwent laparoscopic cholecystectomy.  She had some left knee pain and underwent aspiration to rule out septic arthritis and recommendation was made for further work-up as outpatient.  She was given a left lower extremity immobilizer.  She states that she continues to have pain in multiple joints which is not new.  Pain medication is somewhat effective.  Patient has been working in therapy and is working on transfers.  She requires maximum assist for stand pivot transfer and maximum assist for sit to stand at front wheeled walker.    3/23/23 patient continues to work in therapy and is working on safe transfers.  She requires maximum assist for transfers maximum assist for sit to stand at front wheeled walker.  No new concerns or complaints.  No acute distress.    3/24/2023 patient continues working in therapy.  She requires moderate assist for sit to stand transfers.  She is working in speech therapy as well.  Nurse reporting patient with nausea and vomiting after meals.  She was followed by GI in the hospital and treated with IV Protonix and IV Zofran.  Patient is currently on PPI.  She is having bowel movements according to the nurse.    3/27/23 Patient with Dementia continues to work in speech, physical and occupational therapy. She requires moderate assistance for moderate assistance for transfers. Nurse reports that she has had weight loss and dietician will evaluate.  Presently denies nausea or vomiting, fever, chills or diarrhea. In addition her  BS was low at 42, she takes metformin once daily for DM. BMP was obtained and revealed H&H 7.4 and 12.5. No other concerns today.     3/29/2023 patient is working on transfers in therapy and continues to work toward goals.  She has no new concerns today.  Denies constitutional symptoms.    3/30/23 Patient working in therapy due to weakness and debility. Patient requires moderate assistance for transfers. Denies pain at this time. No acute distress or new concerns. Denies SOB or unexplained weight gain.     3/31/2023 patient with no new concerns today.  She continues to work towards goals in therapy.  Requires wheelchair for mobility.  Denies constitutional symptoms.      Objective  Vital signs: 129/72, 19, 97.5, 94%    Physical Exam  Constitutional:       General: She is not in acute distress.  Eyes:      Extraocular Movements: Extraocular movements intact.   Cardiovascular:      Rate and Rhythm: Regular rhythm.   Pulmonary:      Effort: Pulmonary effort is normal.      Breath sounds: Normal breath sounds.   Abdominal:      General: Bowel sounds are normal.      Palpations: Abdomen is soft.   Genitourinary:     Comments: Bradley  Musculoskeletal:      Cervical back: Neck supple.      Right lower leg: Edema present.      Left lower leg: Edema present.      Comments: Generalized weakness     Neurological:      Mental Status: She is alert.   Psychiatric:         Mood and Affect: Mood normal.         Behavior: Behavior is cooperative.       Assessment/Plan  Problem List Items Addressed This Visit       Chronic diastolic heart failure (CMS/HCC)     Stable  Diuretic  Monitor weight         Hypertension, essential     Controlled  Continue antihypertensives  Continue to monitor blood pressure         Mild late onset Alzheimer's dementia without behavioral disturbance, psychotic disturbance, mood disturbance, or anxiety (CMS/HCC)    Weakness - Primary     Continue therapy          Medications, treatments, and labs  reviewed  Continue medications and treatments as listed in [facility]    KAIDEN Banda      Electronically Signed By: KAIDEN Banda   3/31/23  4:52 PM

## 2023-03-31 NOTE — PROGRESS NOTES
Subjective   Chief complaint: Gwen Fowler is a 89 y.o. female who is a acute skilled care patient being seen and evaluated for weakness    HPI:  3/22/23 patient admitted to SNF for therapy d/t weakness after recent hospitalization s/p fall.  During hospitalization patient had nausea vomiting and abdominal pain.  HIDA scan was performed and showed chronic cholecystitis and patient underwent laparoscopic cholecystectomy.  She had some left knee pain and underwent aspiration to rule out septic arthritis and recommendation was made for further work-up as outpatient.  She was given a left lower extremity immobilizer.  She states that she continues to have pain in multiple joints which is not new.  Pain medication is somewhat effective.  Patient has been working in therapy and is working on transfers.  She requires maximum assist for stand pivot transfer and maximum assist for sit to stand at front wheeled walker.    3/23/23 patient continues to work in therapy and is working on safe transfers.  She requires maximum assist for transfers maximum assist for sit to stand at front wheeled walker.  No new concerns or complaints.  No acute distress.    3/24/2023 patient continues working in therapy.  She requires moderate assist for sit to stand transfers.  She is working in speech therapy as well.  Nurse reporting patient with nausea and vomiting after meals.  She was followed by GI in the hospital and treated with IV Protonix and IV Zofran.  Patient is currently on PPI.  She is having bowel movements according to the nurse.    3/27/23 Patient with Dementia continues to work in speech, physical and occupational therapy. She requires moderate assistance for moderate assistance for transfers. Nurse reports that she has had weight loss and dietician will evaluate.  Presently denies nausea or vomiting, fever, chills or diarrhea. In addition her BS was low at 42, she takes metformin once daily for DM. BMP was obtained and  revealed H&H 7.4 and 12.5. No other concerns today.     3/29/2023 patient is working on transfers in therapy and continues to work toward goals.  She has no new concerns today.  Denies constitutional symptoms.    3/30/23 Patient working in therapy due to weakness and debility. Patient requires moderate assistance for transfers. Denies pain at this time. No acute distress or new concerns. Denies SOB or unexplained weight gain.         Review of Systems  All systems reviewed and negative except for what was mentioned in the HPI    Vital signs: 139/72, 81, 19, 94%    Objective   Physical Exam  Constitutional:       General: She is not in acute distress.  Eyes:      Extraocular Movements: Extraocular movements intact.   Cardiovascular:      Rate and Rhythm: Regular rhythm.   Pulmonary:      Effort: Pulmonary effort is normal.      Breath sounds: Normal breath sounds.   Abdominal:      General: Bowel sounds are normal.      Palpations: Abdomen is soft.   Musculoskeletal:         General: Normal range of motion.      Cervical back: Neck supple.      Right lower leg: No edema.      Left lower leg: No edema.   Neurological:      Mental Status: She is alert.   Psychiatric:         Mood and Affect: Mood normal.         Behavior: Behavior is cooperative.         Assessment/Plan   Problem List Items Addressed This Visit          Circulatory    Chronic diastolic heart failure (CMS/HCC)     Stable  Diuretic  Monitor weight         Hypertension, essential     Continue antihypertensives  Continue to monitor blood pressure            Other    Weakness     Continue therapy          Medications, treatments, and labs reviewed  Continue medications and treatments as listed in [facility]    Scribe Attestation  By signing my name below, Isabel CHAUDHRY, Christinaibcodi   attest that this documentation has been prepared under the direction and in the presence of Keely Braden MD.    Provider Attestation - Scribe documentation  All medical record  entries made by the Scribe were at my direction and personally dictated by me. I have reviewed the chart and agree that the record accurately reflects my personal performance of the history, physical exam, discussion and plan.

## 2023-04-03 ENCOUNTER — NURSING HOME VISIT (OUTPATIENT)
Dept: POST ACUTE CARE | Facility: EXTERNAL LOCATION | Age: 88
End: 2023-04-03
Payer: MEDICARE

## 2023-04-03 DIAGNOSIS — K21.9 GASTROESOPHAGEAL REFLUX DISEASE WITHOUT ESOPHAGITIS: ICD-10-CM

## 2023-04-03 DIAGNOSIS — F32.A DEPRESSION, UNSPECIFIED DEPRESSION TYPE: ICD-10-CM

## 2023-04-03 DIAGNOSIS — G30.1 MILD LATE ONSET ALZHEIMER'S DEMENTIA WITHOUT BEHAVIORAL DISTURBANCE, PSYCHOTIC DISTURBANCE, MOOD DISTURBANCE, OR ANXIETY (MULTI): Primary | ICD-10-CM

## 2023-04-03 DIAGNOSIS — M15.9 PRIMARY OSTEOARTHRITIS INVOLVING MULTIPLE JOINTS: ICD-10-CM

## 2023-04-03 DIAGNOSIS — F02.A0 MILD LATE ONSET ALZHEIMER'S DEMENTIA WITHOUT BEHAVIORAL DISTURBANCE, PSYCHOTIC DISTURBANCE, MOOD DISTURBANCE, OR ANXIETY (MULTI): Primary | ICD-10-CM

## 2023-04-03 DIAGNOSIS — I10 HYPERTENSION, ESSENTIAL: ICD-10-CM

## 2023-04-03 DIAGNOSIS — R53.1 WEAKNESS: ICD-10-CM

## 2023-04-03 PROCEDURE — 99309 SBSQ NF CARE MODERATE MDM 30: CPT | Performed by: INTERNAL MEDICINE

## 2023-04-03 NOTE — LETTER
Patient: Gwen Fowler  : 1930    Encounter Date: 2023    Subjective  Chief complaint: Gwen Fowler is a 89 y.o. female who is a acute skilled care  presents for general medical care and follow up.   HPI:   patient presents for general medical care and f/u.  Patient seen and examined at bedside.  No issues per nursing.  Patient has no acute complaints.    Patient with diagnosis of depression.    Denies feeling down and thoughts of harming self or others.  GERD  Denies heartburn, regurgitation, epigastric discomfort, sour taste, and cough.  HTN  Denies chest pain and headache.    Patient has dementia and requires assist with ADLs.  Patient continues working in therapy.  Patient denies any pain or discomfort.  No acute distress.        Review of Systems  All systems reviewed and negative except for what was mentioned in the HPI    Vital signs: 139/70, 97.2, 69, 20    Objective  Physical Exam  Constitutional:       General: She is not in acute distress.  Eyes:      Extraocular Movements: Extraocular movements intact.   Cardiovascular:      Rate and Rhythm: Regular rhythm.   Pulmonary:      Effort: Pulmonary effort is normal.      Breath sounds: Normal breath sounds.   Abdominal:      General: Bowel sounds are normal.      Palpations: Abdomen is soft.   Musculoskeletal:      Cervical back: Neck supple.      Right lower leg: No edema.      Left lower leg: No edema.   Neurological:      Mental Status: She is alert.   Psychiatric:         Mood and Affect: Mood normal.         Behavior: Behavior is cooperative.         Assessment/Plan  Problem List Items Addressed This Visit       Depression     Mood is stable.  Continue Seroquel.  Monitor for changes in behavior.         GERD (gastroesophageal reflux disease)       GERD controlled.  Continue Pepcid         Hypertension, essential     Continue to monitor blood pressure.  Continue antihypertensive meds.         Mild late onset Alzheimer's dementia without  behavioral disturbance, psychotic disturbance, mood disturbance, or anxiety (CMS/[facility]) - Primary     Mentation at baseline.  Continue to monitor for changes.         Primary osteoarthritis involving multiple joints     Pain is controlled.  Continue PT OT.         Weakness     Positive for weakness.  Continue therapy.          Medications, treatments, and labs reviewed  Continue medications and treatments as listed in [facility]    Scribe Attestation  josh CHAUDHRY Scribe   attest that this documentation has been prepared under the direction and in the presence of Keely Braden MD.    Provider Attestation - Scribe documentation  All medical record entries made by the Scribe were at my direction and personally dictated by me. I have reviewed the chart and agree that the record accurately reflects my personal performance of the history, physical exam, discussion and plan.        Electronically Signed By: Keely Braden MD   4/4/23  6:05 PM

## 2023-04-04 ENCOUNTER — NURSING HOME VISIT (OUTPATIENT)
Dept: POST ACUTE CARE | Facility: EXTERNAL LOCATION | Age: 88
End: 2023-04-04
Payer: MEDICARE

## 2023-04-04 DIAGNOSIS — E11.9 TYPE 2 DIABETES MELLITUS WITHOUT COMPLICATION, WITHOUT LONG-TERM CURRENT USE OF INSULIN (MULTI): ICD-10-CM

## 2023-04-04 DIAGNOSIS — R53.1 WEAKNESS: ICD-10-CM

## 2023-04-04 DIAGNOSIS — I50.32 CHRONIC DIASTOLIC HEART FAILURE (MULTI): ICD-10-CM

## 2023-04-04 PROCEDURE — 99309 SBSQ NF CARE MODERATE MDM 30: CPT | Performed by: INTERNAL MEDICINE

## 2023-04-04 NOTE — PROGRESS NOTES
Subjective   Chief complaint: Gwen Fowler is a 89 y.o. female who is a acute skilled care patient being seen and evaluated for weakkness    HPI:  3/22/23 patient admitted to SNF for therapy d/t weakness after recent hospitalization s/p fall.  During hospitalization patient had nausea vomiting and abdominal pain.  HIDA scan was performed and showed chronic cholecystitis and patient underwent laparoscopic cholecystectomy.  She had some left knee pain and underwent aspiration to rule out septic arthritis and recommendation was made for further work-up as outpatient.  She was given a left lower extremity immobilizer.  She states that she continues to have pain in multiple joints which is not new.  Pain medication is somewhat effective.  Patient has been working in therapy and is working on transfers.  She requires maximum assist for stand pivot transfer and maximum assist for sit to stand at front wheeled walker.    3/23/23 patient continues to work in therapy and is working on safe transfers.  She requires maximum assist for transfers maximum assist for sit to stand at front wheeled walker.  No new concerns or complaints.  No acute distress.    3/24/2023 patient continues working in therapy.  She requires moderate assist for sit to stand transfers.  She is working in speech therapy as well.  Nurse reporting patient with nausea and vomiting after meals.  She was followed by GI in the hospital and treated with IV Protonix and IV Zofran.  Patient is currently on PPI.  She is having bowel movements according to the nurse.    3/27/23 Patient with Dementia continues to work in speech, physical and occupational therapy. She requires moderate assistance for moderate assistance for transfers. Nurse reports that she has had weight loss and dietician will evaluate.  Presently denies nausea or vomiting, fever, chills or diarrhea. In addition her BS was low at 42, she takes metformin once daily for DM. BMP was obtained and  revealed H&H 7.4 and 12.5. No other concerns today.     3/29/2023 patient is working on transfers in therapy and continues to work toward goals.  She has no new concerns today.  Denies constitutional symptoms.    3/30/23 Patient working in therapy due to weakness and debility. Patient requires moderate assistance for transfers. Denies pain at this time. No acute distress or new concerns. Denies SOB or unexplained weight gain.     3/31/2023 patient with no new concerns today.  She continues to work towards goals in therapy.  Requires wheelchair for mobility.  Denies constitutional symptoms.    4/4/23  patient continues to work in therapy due to weakness.  She is working on strengthening and requires a wheelchair for mobility.  No new issues or concerns.  No acute distress.  Denies shortness of breath.        Review of Systems  All systems reviewed and negative except for what was mentioned in the HPI    Vital signs:  139/78, 69, 20, 94% BS 83    Objective   Physical Exam  Constitutional:       General: She is not in acute distress.  Eyes:      Extraocular Movements: Extraocular movements intact.   Cardiovascular:      Rate and Rhythm: Regular rhythm.   Pulmonary:      Effort: Pulmonary effort is normal.      Breath sounds: Normal breath sounds.   Abdominal:      General: Bowel sounds are normal.      Palpations: Abdomen is soft.   Musculoskeletal:      Cervical back: Neck supple.      Right lower leg: No edema.      Left lower leg: No edema.   Neurological:      Mental Status: She is alert.   Psychiatric:         Mood and Affect: Mood normal.         Behavior: Behavior is cooperative.         Assessment/Plan   Problem List Items Addressed This Visit          Circulatory    Chronic diastolic heart failure (CMS/HCC)     Stable  Diuretic  Monitor weight            Endocrine/Metabolic    Type 2 diabetes mellitus without complication, without long-term current use of insulin (CMS/HCC)     Monitor BS  Continue current  meds            Other    Weakness       Continue therapy          Medications, treatments, and labs reviewed  Continue medications and treatments as listed in [facility]    Scribe Attestation  By signing my name below, I, Noe Zamora   attest that this documentation has been prepared under the direction and in the presence of Isabel Horne MA.    Provider Attestation - Scribe documentation  All medical record entries made by the Scribe were at my direction and personally dictated by me. I have reviewed the chart and agree that the record accurately reflects my personal performance of the history, physical exam, discussion and plan.

## 2023-04-04 NOTE — PROGRESS NOTES
Subjective   Chief complaint: Gwen Fowler is a 89 y.o. female who is a acute skilled care  presents for general medical care and follow up.   HPI:   patient presents for general medical care and f/u.  Patient seen and examined at bedside.  No issues per nursing.  Patient has no acute complaints.    Patient with diagnosis of depression.    Denies feeling down and thoughts of harming self or others.  GERD  Denies heartburn, regurgitation, epigastric discomfort, sour taste, and cough.  HTN  Denies chest pain and headache.    Patient has dementia and requires assist with ADLs.  Patient continues working in therapy.  Patient denies any pain or discomfort.  No acute distress.        Review of Systems  All systems reviewed and negative except for what was mentioned in the HPI    Vital signs: 139/70, 97.2, 69, 20    Objective   Physical Exam  Constitutional:       General: She is not in acute distress.  Eyes:      Extraocular Movements: Extraocular movements intact.   Cardiovascular:      Rate and Rhythm: Regular rhythm.   Pulmonary:      Effort: Pulmonary effort is normal.      Breath sounds: Normal breath sounds.   Abdominal:      General: Bowel sounds are normal.      Palpations: Abdomen is soft.   Musculoskeletal:      Cervical back: Neck supple.      Right lower leg: No edema.      Left lower leg: No edema.   Neurological:      Mental Status: She is alert.   Psychiatric:         Mood and Affect: Mood normal.         Behavior: Behavior is cooperative.         Assessment/Plan   Problem List Items Addressed This Visit       Depression     Mood is stable.  Continue Seroquel.  Monitor for changes in behavior.         GERD (gastroesophageal reflux disease)       GERD controlled.  Continue Pepcid         Hypertension, essential     Continue to monitor blood pressure.  Continue antihypertensive meds.         Mild late onset Alzheimer's dementia without behavioral disturbance, psychotic disturbance, mood disturbance, or  anxiety (CMS/[facility]) - Primary     Mentation at baseline.  Continue to monitor for changes.         Primary osteoarthritis involving multiple joints     Pain is controlled.  Continue PT OT.         Weakness     Positive for weakness.  Continue therapy.          Medications, treatments, and labs reviewed  Continue medications and treatments as listed in [facility]    Scribe Attestation  josh CHAUDHRY Scribe   attest that this documentation has been prepared under the direction and in the presence of Keely Braden MD.    Provider Attestation - Scribe documentation  All medical record entries made by the Scribe were at my direction and personally dictated by me. I have reviewed the chart and agree that the record accurately reflects my personal performance of the history, physical exam, discussion and plan.

## 2023-04-04 NOTE — LETTER
Patient: Gwen Fowler  : 1930    Encounter Date: 2023    Subjective  Chief complaint: Gwen Fowler is a 89 y.o. female who is a acute skilled care patient being seen and evaluated for weakkness    HPI:  3/22/23 patient admitted to SNF for therapy d/t weakness after recent hospitalization s/p fall.  During hospitalization patient had nausea vomiting and abdominal pain.  HIDA scan was performed and showed chronic cholecystitis and patient underwent laparoscopic cholecystectomy.  She had some left knee pain and underwent aspiration to rule out septic arthritis and recommendation was made for further work-up as outpatient.  She was given a left lower extremity immobilizer.  She states that she continues to have pain in multiple joints which is not new.  Pain medication is somewhat effective.  Patient has been working in therapy and is working on transfers.  She requires maximum assist for stand pivot transfer and maximum assist for sit to stand at front wheeled walker.    3/23/23 patient continues to work in therapy and is working on safe transfers.  She requires maximum assist for transfers maximum assist for sit to stand at front wheeled walker.  No new concerns or complaints.  No acute distress.    3/24/2023 patient continues working in therapy.  She requires moderate assist for sit to stand transfers.  She is working in speech therapy as well.  Nurse reporting patient with nausea and vomiting after meals.  She was followed by GI in the hospital and treated with IV Protonix and IV Zofran.  Patient is currently on PPI.  She is having bowel movements according to the nurse.    3/27/23 Patient with Dementia continues to work in speech, physical and occupational therapy. She requires moderate assistance for moderate assistance for transfers. Nurse reports that she has had weight loss and dietician will evaluate.  Presently denies nausea or vomiting, fever, chills or diarrhea. In addition her BS was low  at 42, she takes metformin once daily for DM. BMP was obtained and revealed H&H 7.4 and 12.5. No other concerns today.     3/29/2023 patient is working on transfers in therapy and continues to work toward goals.  She has no new concerns today.  Denies constitutional symptoms.    3/30/23 Patient working in therapy due to weakness and debility. Patient requires moderate assistance for transfers. Denies pain at this time. No acute distress or new concerns. Denies SOB or unexplained weight gain.     3/31/2023 patient with no new concerns today.  She continues to work towards goals in therapy.  Requires wheelchair for mobility.  Denies constitutional symptoms.    4/4/23  patient continues to work in therapy due to weakness.  She is working on strengthening and requires a wheelchair for mobility.  No new issues or concerns.  No acute distress.  Denies shortness of breath.        Review of Systems  All systems reviewed and negative except for what was mentioned in the HPI    Vital signs:  139/78, 69, 20, 94% BS 83    Objective  Physical Exam  Constitutional:       General: She is not in acute distress.  Eyes:      Extraocular Movements: Extraocular movements intact.   Cardiovascular:      Rate and Rhythm: Regular rhythm.   Pulmonary:      Effort: Pulmonary effort is normal.      Breath sounds: Normal breath sounds.   Abdominal:      General: Bowel sounds are normal.      Palpations: Abdomen is soft.   Musculoskeletal:      Cervical back: Neck supple.      Right lower leg: No edema.      Left lower leg: No edema.   Neurological:      Mental Status: She is alert.   Psychiatric:         Mood and Affect: Mood normal.         Behavior: Behavior is cooperative.         Assessment/Plan  Problem List Items Addressed This Visit          Circulatory    Chronic diastolic heart failure (CMS/HCC)     Stable  Diuretic  Monitor weight            Endocrine/Metabolic    Type 2 diabetes mellitus without complication, without long-term  current use of insulin (CMS/[facility])     Monitor BS  Continue current meds            Other    Weakness       Continue therapy          Medications, treatments, and labs reviewed  Continue medications and treatments as listed in PCC    Scribe Attestation  By signing my name below, I, Noe Zamora   attest that this documentation has been prepared under the direction and in the presence of Isabel Horne MA.    Provider Attestation - Scribe documentation  All medical record entries made by the Scribe were at my direction and personally dictated by me. I have reviewed the chart and agree that the record accurately reflects my personal performance of the history, physical exam, discussion and plan.      Electronically Signed By: Isabel Horne MA   4/4/23  6:31 PM

## 2023-04-05 ENCOUNTER — NURSING HOME VISIT (OUTPATIENT)
Dept: POST ACUTE CARE | Facility: EXTERNAL LOCATION | Age: 88
End: 2023-04-05
Payer: MEDICARE

## 2023-04-05 DIAGNOSIS — R53.1 WEAKNESS: Primary | ICD-10-CM

## 2023-04-05 DIAGNOSIS — K81.1 CHRONIC CHOLECYSTITIS: ICD-10-CM

## 2023-04-05 DIAGNOSIS — I10 HYPERTENSION, ESSENTIAL: ICD-10-CM

## 2023-04-05 DIAGNOSIS — I50.32 CHRONIC DIASTOLIC HEART FAILURE (MULTI): ICD-10-CM

## 2023-04-05 DIAGNOSIS — G30.1 MILD LATE ONSET ALZHEIMER'S DEMENTIA WITHOUT BEHAVIORAL DISTURBANCE, PSYCHOTIC DISTURBANCE, MOOD DISTURBANCE, OR ANXIETY (MULTI): ICD-10-CM

## 2023-04-05 DIAGNOSIS — F02.A0 MILD LATE ONSET ALZHEIMER'S DEMENTIA WITHOUT BEHAVIORAL DISTURBANCE, PSYCHOTIC DISTURBANCE, MOOD DISTURBANCE, OR ANXIETY (MULTI): ICD-10-CM

## 2023-04-05 PROCEDURE — 99309 SBSQ NF CARE MODERATE MDM 30: CPT | Performed by: NURSE PRACTITIONER

## 2023-04-05 NOTE — LETTER
Patient: Gwen Fowler  : 1930    Encounter Date: 2023    PROGRESS NOTE    Subjective  Chief complaint: Gwen Fowler is a 89 y.o. female who is a acute skilled care patient being seen and evaluated for weakness.    HPI:  3/22/23 patient admitted to SNF for therapy d/t weakness after recent hospitalization s/p fall.  During hospitalization patient had nausea vomiting and abdominal pain.  HIDA scan was performed and showed chronic cholecystitis and patient underwent laparoscopic cholecystectomy.  She had some left knee pain and underwent aspiration to rule out septic arthritis and recommendation was made for further work-up as outpatient.  She was given a left lower extremity immobilizer.  She states that she continues to have pain in multiple joints which is not new.  Pain medication is somewhat effective.  Patient has been working in therapy and is working on transfers.  She requires maximum assist for stand pivot transfer and maximum assist for sit to stand at front wheeled walker.    3/23/23 patient continues to work in therapy and is working on safe transfers.  She requires maximum assist for transfers maximum assist for sit to stand at front wheeled walker.  No new concerns or complaints.  No acute distress.    3/24/2023 patient continues working in therapy.  She requires moderate assist for sit to stand transfers.  She is working in speech therapy as well.  Nurse reporting patient with nausea and vomiting after meals.  She was followed by GI in the hospital and treated with IV Protonix and IV Zofran.  Patient is currently on PPI.  She is having bowel movements according to the nurse.    3/27/23 Patient with Dementia continues to work in speech, physical and occupational therapy. She requires moderate assistance for moderate assistance for transfers. Nurse reports that she has had weight loss and dietician will evaluate.  Presently denies nausea or vomiting, fever, chills or diarrhea. In  addition her BS was low at 42, she takes metformin once daily for DM. BMP was obtained and revealed H&H 7.4 and 12.5. No other concerns today.     3/29/2023 patient is working on transfers in therapy and continues to work toward goals.  She has no new concerns today.  Denies constitutional symptoms.    3/30/23 Patient working in therapy due to weakness and debility. Patient requires moderate assistance for transfers. Denies pain at this time. No acute distress or new concerns. Denies SOB or unexplained weight gain.     3/31/2023 patient with no new concerns today.  She continues to work towards goals in therapy.  Requires wheelchair for mobility.  Denies constitutional symptoms.    4/4/23  patient continues to work in therapy due to weakness.  She is working on strengthening and requires a wheelchair for mobility.  No new issues or concerns.  No acute distress.  Denies shortness of breath.    4/5/23  Patient has been working in therapy to improve strength, endurance, and ADLs.  Patient continues to work toward goals.  No new concerns today.  Denies n/v/f/c pain.        Objective  Vital signs: 20, 135/70, 97.5, 66, 95%  Physical Exam  Constitutional:       General: She is not in acute distress.  Eyes:      Extraocular Movements: Extraocular movements intact.   Cardiovascular:      Rate and Rhythm: Regular rhythm.   Pulmonary:      Effort: Pulmonary effort is normal.      Breath sounds: Normal breath sounds.   Abdominal:      General: Bowel sounds are normal.      Palpations: Abdomen is soft.   Genitourinary:     Comments: Bradley  Musculoskeletal:      Cervical back: Neck supple.      Right lower leg: Edema present.      Left lower leg: Edema present.      Comments: Generalized weakness     Neurological:      Mental Status: She is alert.   Psychiatric:         Mood and Affect: Mood normal.         Behavior: Behavior is cooperative.         Assessment/Plan  Problem List Items Addressed This Visit       Chronic  cholecystitis     Status post laparoscopic cholecystectomy  No n/v today  Cont to monitor         Chronic diastolic heart failure (CMS/HCC)     Stable, no sob   Cont Diuretic  Monitor weight         Hypertension, essential     Controlled, BP at goal  Continue antihypertensives  Continue to monitor blood pressure         Mild late onset Alzheimer's dementia without behavioral disturbance, psychotic disturbance, mood disturbance, or anxiety (CMS/HCC)    Weakness - Primary     Continue with therapy          Medications, treatments, and labs reviewed  Continue medications and treatments as listed in PCC    Scribe Attestation  IFiorella Scribe   attest that this documentation has been prepared under the direction and in the presence of KAIDEN Banda    Provider Attestation - Scribe documentation  All medical record entries made by the Scribe were at my direction and personally dictated by me. I have reviewed the chart and agree that the record accurately reflects my personal performance of the history, physical exam, discussion and plan.   KAIDEN Banda        Electronically Signed By: KAIDEN Banda   4/14/23  6:34 PM

## 2023-04-06 ENCOUNTER — NURSING HOME VISIT (OUTPATIENT)
Dept: POST ACUTE CARE | Facility: EXTERNAL LOCATION | Age: 88
End: 2023-04-06
Payer: MEDICARE

## 2023-04-06 DIAGNOSIS — I50.32 CHRONIC DIASTOLIC HEART FAILURE (MULTI): ICD-10-CM

## 2023-04-06 DIAGNOSIS — R53.1 WEAKNESS: ICD-10-CM

## 2023-04-06 PROCEDURE — 99309 SBSQ NF CARE MODERATE MDM 30: CPT | Performed by: INTERNAL MEDICINE

## 2023-04-06 NOTE — LETTER
Patient: Gwen Fowler  : 1930    Encounter Date: 2023    PROGRESS NOTE    Subjective  Chief complaint: Gwen Fowler is a 89 y.o. female who is an acute skilled patient being seen and evaluated for weakness    HPI:  3/22/23 patient admitted to SNF for therapy d/t weakness after recent hospitalization s/p fall.  During hospitalization patient had nausea vomiting and abdominal pain.  HIDA scan was performed and showed chronic cholecystitis and patient underwent laparoscopic cholecystectomy.  She had some left knee pain and underwent aspiration to rule out septic arthritis and recommendation was made for further work-up as outpatient.  She was given a left lower extremity immobilizer.  She states that she continues to have pain in multiple joints which is not new.  Pain medication is somewhat effective.  Patient has been working in therapy and is working on transfers.  She requires maximum assist for stand pivot transfer and maximum assist for sit to stand at front wheeled walker.    3/23/23 patient continues to work in therapy and is working on safe transfers.  She requires maximum assist for transfers maximum assist for sit to stand at front wheeled walker.  No new concerns or complaints.  No acute distress.    3/24/2023 patient continues working in therapy.  She requires moderate assist for sit to stand transfers.  She is working in speech therapy as well.  Nurse reporting patient with nausea and vomiting after meals.  She was followed by GI in the hospital and treated with IV Protonix and IV Zofran.  Patient is currently on PPI.  She is having bowel movements according to the nurse.    3/27/23 Patient with Dementia continues to work in speech, physical and occupational therapy. She requires moderate assistance for moderate assistance for transfers. Nurse reports that she has had weight loss and dietician will evaluate.  Presently denies nausea or vomiting, fever, chills or diarrhea. In addition  her BS was low at 42, she takes metformin once daily for DM. BMP was obtained and revealed H&H 7.4 and 12.5. No other concerns today.     3/29/2023 patient is working on transfers in therapy and continues to work toward goals.  She has no new concerns today.  Denies constitutional symptoms.    3/30/23 Patient working in therapy due to weakness and debility. Patient requires moderate assistance for transfers. Denies pain at this time. No acute distress or new concerns. Denies SOB or unexplained weight gain.     3/31/2023 patient with no new concerns today.  She continues to work towards goals in therapy.  Requires wheelchair for mobility.  Denies constitutional symptoms.    4/4/23  patient continues to work in therapy due to weakness.  She is working on strengthening and requires a wheelchair for mobility.  No new issues or concerns.  No acute distress.  Denies shortness of breath.    4/6/23 Patient with weakness is working in therapy. She requires wheelchair for mobility. Denies SOB, orthopnea or weight gain. No acute distress.       Objective  Vital signs: 105/62, 65, 18, 96%    Physical Exam  Constitutional:       General: She is not in acute distress.  Eyes:      Extraocular Movements: Extraocular movements intact.   Cardiovascular:      Rate and Rhythm: Regular rhythm.   Pulmonary:      Effort: Pulmonary effort is normal.      Breath sounds: Normal breath sounds.   Abdominal:      General: Bowel sounds are normal.      Palpations: Abdomen is soft.   Musculoskeletal:      Cervical back: Neck supple.      Right lower leg: No edema.      Left lower leg: No edema.   Neurological:      Mental Status: She is alert.   Psychiatric:         Mood and Affect: Mood normal.         Behavior: Behavior is cooperative.         Assessment/Plan  Problem List Items Addressed This Visit          Circulatory    Chronic diastolic heart failure (CMS/HCC)     Stable  Diuretic  Monitor weight            Other    Weakness       Continue  therapy          Medications, treatments, and labs reviewed  Continue medications and treatments as listed in PCC    Scribe Attestation  By signing my name below, I, Christina Zamoraibcodi   attest that this documentation has been prepared under the direction and in the presence of Keely Braden MD.    Provider Attestation - Scribe documentation  All medical record entries made by the Scribe were at my direction and personally dictated by me. I have reviewed the chart and agree that the record accurately reflects my personal performance of the history, physical exam, discussion and plan.      Electronically Signed By: Keely Braden MD   4/8/23  5:53 PM

## 2023-04-07 ENCOUNTER — NURSING HOME VISIT (OUTPATIENT)
Dept: POST ACUTE CARE | Facility: EXTERNAL LOCATION | Age: 88
End: 2023-04-07
Payer: MEDICARE

## 2023-04-07 DIAGNOSIS — F02.A0 MILD LATE ONSET ALZHEIMER'S DEMENTIA WITHOUT BEHAVIORAL DISTURBANCE, PSYCHOTIC DISTURBANCE, MOOD DISTURBANCE, OR ANXIETY (MULTI): ICD-10-CM

## 2023-04-07 DIAGNOSIS — R53.1 WEAKNESS: ICD-10-CM

## 2023-04-07 DIAGNOSIS — I50.32 CHRONIC DIASTOLIC HEART FAILURE (MULTI): ICD-10-CM

## 2023-04-07 DIAGNOSIS — G30.1 MILD LATE ONSET ALZHEIMER'S DEMENTIA WITHOUT BEHAVIORAL DISTURBANCE, PSYCHOTIC DISTURBANCE, MOOD DISTURBANCE, OR ANXIETY (MULTI): ICD-10-CM

## 2023-04-07 DIAGNOSIS — I10 HYPERTENSION, ESSENTIAL: Primary | ICD-10-CM

## 2023-04-07 PROBLEM — R11.2 NAUSEA AND VOMITING IN ADULT: Status: RESOLVED | Noted: 2023-03-24 | Resolved: 2023-04-07

## 2023-04-07 PROCEDURE — 99309 SBSQ NF CARE MODERATE MDM 30: CPT | Performed by: NURSE PRACTITIONER

## 2023-04-07 NOTE — LETTER
Patient: Gwen Fowler  : 1930    Encounter Date: 2023    PROGRESS NOTE    Subjective  Chief complaint: Gwen Fowler is a 89 y.o. female who is an acute skilled patient being seen and evaluated for weakness    HPI:  3/22/23 patient admitted to SNF for therapy d/t weakness after recent hospitalization s/p fall.  During hospitalization patient had nausea vomiting and abdominal pain.  HIDA scan was performed and showed chronic cholecystitis and patient underwent laparoscopic cholecystectomy.  She had some left knee pain and underwent aspiration to rule out septic arthritis and recommendation was made for further work-up as outpatient.  She was given a left lower extremity immobilizer.  She states that she continues to have pain in multiple joints which is not new.  Pain medication is somewhat effective.  Patient has been working in therapy and is working on transfers.  She requires maximum assist for stand pivot transfer and maximum assist for sit to stand at front wheeled walker.    3/23/23 patient continues to work in therapy and is working on safe transfers.  She requires maximum assist for transfers maximum assist for sit to stand at front wheeled walker.  No new concerns or complaints.  No acute distress.    3/24/2023 patient continues working in therapy.  She requires moderate assist for sit to stand transfers.  She is working in speech therapy as well.  Nurse reporting patient with nausea and vomiting after meals.  She was followed by GI in the hospital and treated with IV Protonix and IV Zofran.  Patient is currently on PPI.  She is having bowel movements according to the nurse.    3/27/23 Patient with Dementia continues to work in speech, physical and occupational therapy. She requires moderate assistance for moderate assistance for transfers. Nurse reports that she has had weight loss and dietician will evaluate.  Presently denies nausea or vomiting, fever, chills or diarrhea. In addition  her BS was low at 42, she takes metformin once daily for DM. BMP was obtained and revealed H&H 7.4 and 12.5. No other concerns today.     3/29/2023 patient is working on transfers in therapy and continues to work toward goals.  She has no new concerns today.  Denies constitutional symptoms.    3/30/23 Patient working in therapy due to weakness and debility. Patient requires moderate assistance for transfers. Denies pain at this time. No acute distress or new concerns. Denies SOB or unexplained weight gain.     3/31/2023 patient with no new concerns today.  She continues to work towards goals in therapy.  Requires wheelchair for mobility.  Denies constitutional symptoms.    4/4/23  patient continues to work in therapy due to weakness.  She is working on strengthening and requires a wheelchair for mobility.  No new issues or concerns.  No acute distress.  Denies shortness of breath.    4/5/23  Patient has been working in therapy to improve strength, endurance, and ADLs.  Patient continues to work toward goals.  No new concerns today.  Denies n/v/f/c pain.      4/7/23 Patient has no new complaints today. Compliant with therapies and requires partial to mod assistance with most activies. She continues to work toward goals.        Objective  Vital signs: 125/54 HR 75 O2 98%    Physical Exam  Constitutional:       General: She is not in acute distress.  Eyes:      Extraocular Movements: Extraocular movements intact.   Cardiovascular:      Rate and Rhythm: Regular rhythm.   Pulmonary:      Effort: Pulmonary effort is normal.      Breath sounds: Normal breath sounds.   Abdominal:      General: Bowel sounds are normal.      Palpations: Abdomen is soft.   Genitourinary:     Comments: Bradley catheter, patent draining yellow urine.   Musculoskeletal:      Cervical back: Neck supple.      Right lower leg: Edema present.      Left lower leg: Edema present.   Neurological:      Mental Status: She is alert.   Psychiatric:          Mood and Affect: Mood normal.         Behavior: Behavior is cooperative.         Assessment/Plan  Problem List Items Addressed This Visit       Chronic diastolic heart failure (CMS/HCC)     Stable  Diuretic  Monitor weight         Hypertension, essential - Primary     Controlled  Continue antihypertensives  Continue to monitor blood pressure         Mild late onset Alzheimer's dementia without behavioral disturbance, psychotic disturbance, mood disturbance, or anxiety (CMS/HCC)    Weakness       Continue therapy  Continue therapy          Medications, treatments, and labs reviewed  Continue medications and treatments as listed in PCC    Scribe Attestation  IPadmaja Scribe   attest that this documentation has been prepared under the direction and in the presence of KAIDEN Banda.    Provider Attestation - Scribe documentation  All medical record entries made by the Scribe were at my direction and personally dictated by me. I have reviewed the chart and agree that the record accurately reflects my personal performance of the history, physical exam, discussion and plan.  KAIDEN Banda        Electronically Signed By: KAIDEN Banda   4/14/23  6:57 PM

## 2023-04-07 NOTE — PROGRESS NOTES
PROGRESS NOTE    Subjective   Chief complaint: Gwen Fowler is a 89 y.o. female who is an acute skilled patient being seen and evaluated for weakness    HPI:  3/22/23 patient admitted to SNF for therapy d/t weakness after recent hospitalization s/p fall.  During hospitalization patient had nausea vomiting and abdominal pain.  HIDA scan was performed and showed chronic cholecystitis and patient underwent laparoscopic cholecystectomy.  She had some left knee pain and underwent aspiration to rule out septic arthritis and recommendation was made for further work-up as outpatient.  She was given a left lower extremity immobilizer.  She states that she continues to have pain in multiple joints which is not new.  Pain medication is somewhat effective.  Patient has been working in therapy and is working on transfers.  She requires maximum assist for stand pivot transfer and maximum assist for sit to stand at front wheeled walker.    3/23/23 patient continues to work in therapy and is working on safe transfers.  She requires maximum assist for transfers maximum assist for sit to stand at front wheeled walker.  No new concerns or complaints.  No acute distress.    3/24/2023 patient continues working in therapy.  She requires moderate assist for sit to stand transfers.  She is working in speech therapy as well.  Nurse reporting patient with nausea and vomiting after meals.  She was followed by GI in the hospital and treated with IV Protonix and IV Zofran.  Patient is currently on PPI.  She is having bowel movements according to the nurse.    3/27/23 Patient with Dementia continues to work in speech, physical and occupational therapy. She requires moderate assistance for moderate assistance for transfers. Nurse reports that she has had weight loss and dietician will evaluate.  Presently denies nausea or vomiting, fever, chills or diarrhea. In addition her BS was low at 42, she takes metformin once daily for DM. BMP was  obtained and revealed H&H 7.4 and 12.5. No other concerns today.     3/29/2023 patient is working on transfers in therapy and continues to work toward goals.  She has no new concerns today.  Denies constitutional symptoms.    3/30/23 Patient working in therapy due to weakness and debility. Patient requires moderate assistance for transfers. Denies pain at this time. No acute distress or new concerns. Denies SOB or unexplained weight gain.     3/31/2023 patient with no new concerns today.  She continues to work towards goals in therapy.  Requires wheelchair for mobility.  Denies constitutional symptoms.    4/4/23  patient continues to work in therapy due to weakness.  She is working on strengthening and requires a wheelchair for mobility.  No new issues or concerns.  No acute distress.  Denies shortness of breath.    4/5/23  Patient has been working in therapy to improve strength, endurance, and ADLs.  Patient continues to work toward goals.  No new concerns today.  Denies n/v/f/c pain.      4/7/23 Patient has no new complaints today. Compliant with therapies and requires partial to mod assistance with most activies. She continues to work toward goals.        Objective   Vital signs: 125/54 HR 75 O2 98%    Physical Exam  Constitutional:       General: She is not in acute distress.  Eyes:      Extraocular Movements: Extraocular movements intact.   Cardiovascular:      Rate and Rhythm: Regular rhythm.   Pulmonary:      Effort: Pulmonary effort is normal.      Breath sounds: Normal breath sounds.   Abdominal:      General: Bowel sounds are normal.      Palpations: Abdomen is soft.   Genitourinary:     Comments: Bradley catheter, patent draining yellow urine.   Musculoskeletal:      Cervical back: Neck supple.      Right lower leg: Edema present.      Left lower leg: Edema present.   Neurological:      Mental Status: She is alert.   Psychiatric:         Mood and Affect: Mood normal.         Behavior: Behavior is  cooperative.         Assessment/Plan   Problem List Items Addressed This Visit       Chronic diastolic heart failure (CMS/HCC)     Stable  Diuretic  Monitor weight         Hypertension, essential - Primary     Controlled  Continue antihypertensives  Continue to monitor blood pressure         Mild late onset Alzheimer's dementia without behavioral disturbance, psychotic disturbance, mood disturbance, or anxiety (CMS/HCC)    Weakness       Continue therapy  Continue therapy          Medications, treatments, and labs reviewed  Continue medications and treatments as listed in PCC    Scribe Attestation  IPadmaja Scribe   attest that this documentation has been prepared under the direction and in the presence of KAIDEN Banda.    Provider Attestation - Scribe documentation  All medical record entries made by the Scribe were at my direction and personally dictated by me. I have reviewed the chart and agree that the record accurately reflects my personal performance of the history, physical exam, discussion and plan.  KAIDEN Banda

## 2023-04-08 NOTE — PROGRESS NOTES
PROGRESS NOTE    Subjective   Chief complaint: Gwen Fowler is a 89 y.o. female who is an acute skilled patient being seen and evaluated for weakness    HPI:  3/22/23 patient admitted to SNF for therapy d/t weakness after recent hospitalization s/p fall.  During hospitalization patient had nausea vomiting and abdominal pain.  HIDA scan was performed and showed chronic cholecystitis and patient underwent laparoscopic cholecystectomy.  She had some left knee pain and underwent aspiration to rule out septic arthritis and recommendation was made for further work-up as outpatient.  She was given a left lower extremity immobilizer.  She states that she continues to have pain in multiple joints which is not new.  Pain medication is somewhat effective.  Patient has been working in therapy and is working on transfers.  She requires maximum assist for stand pivot transfer and maximum assist for sit to stand at front wheeled walker.    3/23/23 patient continues to work in therapy and is working on safe transfers.  She requires maximum assist for transfers maximum assist for sit to stand at front wheeled walker.  No new concerns or complaints.  No acute distress.    3/24/2023 patient continues working in therapy.  She requires moderate assist for sit to stand transfers.  She is working in speech therapy as well.  Nurse reporting patient with nausea and vomiting after meals.  She was followed by GI in the hospital and treated with IV Protonix and IV Zofran.  Patient is currently on PPI.  She is having bowel movements according to the nurse.    3/27/23 Patient with Dementia continues to work in speech, physical and occupational therapy. She requires moderate assistance for moderate assistance for transfers. Nurse reports that she has had weight loss and dietician will evaluate.  Presently denies nausea or vomiting, fever, chills or diarrhea. In addition her BS was low at 42, she takes metformin once daily for DM. BMP was  obtained and revealed H&H 7.4 and 12.5. No other concerns today.     3/29/2023 patient is working on transfers in therapy and continues to work toward goals.  She has no new concerns today.  Denies constitutional symptoms.    3/30/23 Patient working in therapy due to weakness and debility. Patient requires moderate assistance for transfers. Denies pain at this time. No acute distress or new concerns. Denies SOB or unexplained weight gain.     3/31/2023 patient with no new concerns today.  She continues to work towards goals in therapy.  Requires wheelchair for mobility.  Denies constitutional symptoms.    4/4/23  patient continues to work in therapy due to weakness.  She is working on strengthening and requires a wheelchair for mobility.  No new issues or concerns.  No acute distress.  Denies shortness of breath.    4/6/23 Patient with weakness is working in therapy. She requires wheelchair for mobility. Denies SOB, orthopnea or weight gain. No acute distress.       Objective   Vital signs: 105/62, 65, 18, 96%    Physical Exam  Constitutional:       General: She is not in acute distress.  Eyes:      Extraocular Movements: Extraocular movements intact.   Cardiovascular:      Rate and Rhythm: Regular rhythm.   Pulmonary:      Effort: Pulmonary effort is normal.      Breath sounds: Normal breath sounds.   Abdominal:      General: Bowel sounds are normal.      Palpations: Abdomen is soft.   Musculoskeletal:      Cervical back: Neck supple.      Right lower leg: No edema.      Left lower leg: No edema.   Neurological:      Mental Status: She is alert.   Psychiatric:         Mood and Affect: Mood normal.         Behavior: Behavior is cooperative.         Assessment/Plan   Problem List Items Addressed This Visit          Circulatory    Chronic diastolic heart failure (CMS/HCC)     Stable  Diuretic  Monitor weight            Other    Weakness       Continue therapy          Medications, treatments, and labs  reviewed  Continue medications and treatments as listed in PCC    Scribe Attestation  By signing my name below, I, Noe Zamora   attest that this documentation has been prepared under the direction and in the presence of Keely Braden MD.    Provider Attestation - Scribe documentation  All medical record entries made by the Scribe were at my direction and personally dictated by me. I have reviewed the chart and agree that the record accurately reflects my personal performance of the history, physical exam, discussion and plan.

## 2023-04-10 ENCOUNTER — NURSING HOME VISIT (OUTPATIENT)
Dept: POST ACUTE CARE | Facility: EXTERNAL LOCATION | Age: 88
End: 2023-04-10
Payer: MEDICARE

## 2023-04-10 DIAGNOSIS — R33.9 URINARY RETENTION: ICD-10-CM

## 2023-04-10 DIAGNOSIS — I50.32 CHRONIC DIASTOLIC HEART FAILURE (MULTI): ICD-10-CM

## 2023-04-10 DIAGNOSIS — R53.1 WEAKNESS: ICD-10-CM

## 2023-04-10 PROCEDURE — 99308 SBSQ NF CARE LOW MDM 20: CPT | Performed by: INTERNAL MEDICINE

## 2023-04-10 NOTE — LETTER
Patient: Gwen Fowler  : 1930    Encounter Date: 04/10/2023    PROGRESS NOTE    Subjective  Chief complaint: Gwen Fowler is a 89 y.o. female who is an acute skilled patient being seen and evaluated for weakness, UR    HPI:  3/22/23 patient admitted to SNF for therapy d/t weakness after recent hospitalization s/p fall.  During hospitalization patient had nausea vomiting and abdominal pain.  HIDA scan was performed and showed chronic cholecystitis and patient underwent laparoscopic cholecystectomy.  She had some left knee pain and underwent aspiration to rule out septic arthritis and recommendation was made for further work-up as outpatient.  She was given a left lower extremity immobilizer.  She states that she continues to have pain in multiple joints which is not new.  Pain medication is somewhat effective.  Patient has been working in therapy and is working on transfers.  She requires maximum assist for stand pivot transfer and maximum assist for sit to stand at front wheeled walker.    3/23/23 patient continues to work in therapy and is working on safe transfers.  She requires maximum assist for transfers maximum assist for sit to stand at front wheeled walker.  No new concerns or complaints.  No acute distress.    3/24/2023 patient continues working in therapy.  She requires moderate assist for sit to stand transfers.  She is working in speech therapy as well.  Nurse reporting patient with nausea and vomiting after meals.  She was followed by GI in the hospital and treated with IV Protonix and IV Zofran.  Patient is currently on PPI.  She is having bowel movements according to the nurse.    3/27/23 Patient with Dementia continues to work in speech, physical and occupational therapy. She requires moderate assistance for moderate assistance for transfers. Nurse reports that she has had weight loss and dietician will evaluate.  Presently denies nausea or vomiting, fever, chills or diarrhea. In  addition her BS was low at 42, she takes metformin once daily for DM. BMP was obtained and revealed H&H 7.4 and 12.5. No other concerns today.     3/29/2023 patient is working on transfers in therapy and continues to work toward goals.  She has no new concerns today.  Denies constitutional symptoms.    3/30/23 Patient working in therapy due to weakness and debility. Patient requires moderate assistance for transfers. Denies pain at this time. No acute distress or new concerns. Denies SOB or unexplained weight gain.     3/31/2023 patient with no new concerns today.  She continues to work towards goals in therapy.  Requires wheelchair for mobility.  Denies constitutional symptoms.    4/4/23  patient continues to work in therapy due to weakness.  She is working on strengthening and requires a wheelchair for mobility.  No new issues or concerns.  No acute distress.  Denies shortness of breath.    4/5/23  Patient has been working in therapy to improve strength, endurance, and ADLs.  Patient continues to work toward goals.  No new concerns today.  Denies n/v/f/c pain.      4/7/23 Patient has no new complaints today. Compliant with therapies and requires partial to mod assistance with most activies. She continues to work toward goals.        4/10/23 Patient with UR would like to have lorenz catheter removed and attempt voiding trial. Denies ABD discomfort or burning with urination. Patient requires moderate assistance for transfers and ADL's.       Objective  Vital signs: 145/78, 98, 18, 98%    Physical Exam  Constitutional:       General: She is not in acute distress.  Eyes:      Extraocular Movements: Extraocular movements intact.   Pulmonary:      Effort: Pulmonary effort is normal.      Breath sounds: Normal breath sounds.   Abdominal:      General: Bowel sounds are normal.      Palpations: Abdomen is soft.   Genitourinary:     Comments: Lorenz catheter  Musculoskeletal:      Cervical back: Neck supple.      Right lower  leg: No edema.      Left lower leg: No edema.   Neurological:      Mental Status: She is alert.   Psychiatric:         Mood and Affect: Mood normal.         Behavior: Behavior is cooperative.         Assessment/Plan  Problem List Items Addressed This Visit          Circulatory    Chronic diastolic heart failure (CMS/HCC)     Stable  Diuretic  Monitor weight            Genitourinary    Urinary retention     Remove Bradley cath  Initiate voiding trial            Other    Weakness       Continue therapy          Medications, treatments, and labs reviewed  Continue medications and treatments as listed in PCC    Scribe Attestation  By signing my name below, IIsabel, Scribe   attest that this documentation has been prepared under the direction and in the presence of Keely Braden MD.    Provider Attestation - Scribe documentation  All medical record entries made by the Scribe were at my direction and personally dictated by me. I have reviewed the chart and agree that the record accurately reflects my personal performance of the history, physical exam, discussion and plan.        Electronically Signed By: Keely Braden MD   4/11/23  6:45 PM

## 2023-04-11 ENCOUNTER — NURSING HOME VISIT (OUTPATIENT)
Dept: POST ACUTE CARE | Facility: EXTERNAL LOCATION | Age: 88
End: 2023-04-11
Payer: MEDICARE

## 2023-04-11 DIAGNOSIS — R53.1 WEAKNESS: ICD-10-CM

## 2023-04-11 DIAGNOSIS — R33.9 URINARY RETENTION: ICD-10-CM

## 2023-04-11 DIAGNOSIS — I50.32 CHRONIC DIASTOLIC HEART FAILURE (MULTI): ICD-10-CM

## 2023-04-11 PROCEDURE — 99308 SBSQ NF CARE LOW MDM 20: CPT | Performed by: INTERNAL MEDICINE

## 2023-04-11 NOTE — LETTER
Patient: Gwen Fowler  : 1930    Encounter Date: 2023    PROGRESS NOTE    Subjective  Chief complaint: Gwen Fowler is a 89 y.o. female who is an acute skilled patient being seen and evaluated for weakness    HPI:  3/22/23 patient admitted to SNF for therapy d/t weakness after recent hospitalization s/p fall.  During hospitalization patient had nausea vomiting and abdominal pain.  HIDA scan was performed and showed chronic cholecystitis and patient underwent laparoscopic cholecystectomy.  She had some left knee pain and underwent aspiration to rule out septic arthritis and recommendation was made for further work-up as outpatient.  She was given a left lower extremity immobilizer.  She states that she continues to have pain in multiple joints which is not new.  Pain medication is somewhat effective.  Patient has been working in therapy and is working on transfers.  She requires maximum assist for stand pivot transfer and maximum assist for sit to stand at front wheeled walker.    3/23/23 patient continues to work in therapy and is working on safe transfers.  She requires maximum assist for transfers maximum assist for sit to stand at front wheeled walker.  No new concerns or complaints.  No acute distress.    3/24/2023 patient continues working in therapy.  She requires moderate assist for sit to stand transfers.  She is working in speech therapy as well.  Nurse reporting patient with nausea and vomiting after meals.  She was followed by GI in the hospital and treated with IV Protonix and IV Zofran.  Patient is currently on PPI.  She is having bowel movements according to the nurse.    3/27/23 Patient with Dementia continues to work in speech, physical and occupational therapy. She requires moderate assistance for moderate assistance for transfers. Nurse reports that she has had weight loss and dietician will evaluate.  Presently denies nausea or vomiting, fever, chills or diarrhea. In addition  her BS was low at 42, she takes metformin once daily for DM. BMP was obtained and revealed H&H 7.4 and 12.5. No other concerns today.     3/29/2023 patient is working on transfers in therapy and continues to work toward goals.  She has no new concerns today.  Denies constitutional symptoms.    3/30/23 Patient working in therapy due to weakness and debility. Patient requires moderate assistance for transfers. Denies pain at this time. No acute distress or new concerns. Denies SOB or unexplained weight gain.     3/31/2023 patient with no new concerns today.  She continues to work towards goals in therapy.  Requires wheelchair for mobility.  Denies constitutional symptoms.    4/4/23  patient continues to work in therapy due to weakness.  She is working on strengthening and requires a wheelchair for mobility.  No new issues or concerns.  No acute distress.  Denies shortness of breath.    4/5/23  Patient has been working in therapy to improve strength, endurance, and ADLs.  Patient continues to work toward goals.  No new concerns today.  Denies n/v/f/c pain.      4/7/23 Patient has no new complaints today. Compliant with therapies and requires partial to mod assistance with most activies. She continues to work toward goals.        4/10/23 Patient with UR would like to have lorenz catheter removed and attempt voiding trial. Denies ABD discomfort or burning with urination. Patient requires moderate assistance for transfers and ADL's.     4/11/23 Patient working in therapy and requires moderate assistance for transfers. She had Lorenz removed yesterday and is still voiding well without it. Denies burning or pressure. Denies SOB. No acute distress.       Objective  Vital signs: 139/56, 96%    Physical Exam  Constitutional:       General: She is not in acute distress.  Eyes:      Extraocular Movements: Extraocular movements intact.   Cardiovascular:      Rate and Rhythm: Normal rate and regular rhythm.   Pulmonary:      Effort:  Pulmonary effort is normal.      Breath sounds: Normal breath sounds.   Abdominal:      General: Bowel sounds are normal.      Palpations: Abdomen is soft.   Musculoskeletal:         General: Normal range of motion.      Cervical back: Normal range of motion and neck supple.      Right lower leg: No edema.      Left lower leg: No edema.   Neurological:      Mental Status: She is alert.   Psychiatric:         Mood and Affect: Mood normal.         Behavior: Behavior is cooperative.         Assessment/Plan  Problem List Items Addressed This Visit          Circulatory    Chronic diastolic heart failure (CMS/HCC)     Stable, no sob   Cont Diuretic  Monitor weight            Genitourinary    Urinary retention     Urinating well without Bradley cath            Other    Weakness     Continue with therapy          Medications, treatments, and labs reviewed  Continue medications and treatments as listed in PCC    Scribe Attestation  By signing my name below, I, Isabel Horne Scribe   attest that this documentation has been prepared under the direction and in the presence of Keely Braden MD.    Provider Attestation - Scribe documentation  All medical record entries made by the Scribe were at my direction and personally dictated by me. I have reviewed the chart and agree that the record accurately reflects my personal performance of the history, physical exam, discussion and plan.      Electronically Signed By: Keely Braden MD   4/14/23  5:27 PM

## 2023-04-11 NOTE — PROGRESS NOTES
PROGRESS NOTE    Subjective   Chief complaint: Gwen Fowler is a 89 y.o. female who is an acute skilled patient being seen and evaluated for weakness, UR    HPI:  3/22/23 patient admitted to SNF for therapy d/t weakness after recent hospitalization s/p fall.  During hospitalization patient had nausea vomiting and abdominal pain.  HIDA scan was performed and showed chronic cholecystitis and patient underwent laparoscopic cholecystectomy.  She had some left knee pain and underwent aspiration to rule out septic arthritis and recommendation was made for further work-up as outpatient.  She was given a left lower extremity immobilizer.  She states that she continues to have pain in multiple joints which is not new.  Pain medication is somewhat effective.  Patient has been working in therapy and is working on transfers.  She requires maximum assist for stand pivot transfer and maximum assist for sit to stand at front wheeled walker.    3/23/23 patient continues to work in therapy and is working on safe transfers.  She requires maximum assist for transfers maximum assist for sit to stand at front wheeled walker.  No new concerns or complaints.  No acute distress.    3/24/2023 patient continues working in therapy.  She requires moderate assist for sit to stand transfers.  She is working in speech therapy as well.  Nurse reporting patient with nausea and vomiting after meals.  She was followed by GI in the hospital and treated with IV Protonix and IV Zofran.  Patient is currently on PPI.  She is having bowel movements according to the nurse.    3/27/23 Patient with Dementia continues to work in speech, physical and occupational therapy. She requires moderate assistance for moderate assistance for transfers. Nurse reports that she has had weight loss and dietician will evaluate.  Presently denies nausea or vomiting, fever, chills or diarrhea. In addition her BS was low at 42, she takes metformin once daily for DM. BMP was  obtained and revealed H&H 7.4 and 12.5. No other concerns today.     3/29/2023 patient is working on transfers in therapy and continues to work toward goals.  She has no new concerns today.  Denies constitutional symptoms.    3/30/23 Patient working in therapy due to weakness and debility. Patient requires moderate assistance for transfers. Denies pain at this time. No acute distress or new concerns. Denies SOB or unexplained weight gain.     3/31/2023 patient with no new concerns today.  She continues to work towards goals in therapy.  Requires wheelchair for mobility.  Denies constitutional symptoms.    4/4/23  patient continues to work in therapy due to weakness.  She is working on strengthening and requires a wheelchair for mobility.  No new issues or concerns.  No acute distress.  Denies shortness of breath.    4/5/23  Patient has been working in therapy to improve strength, endurance, and ADLs.  Patient continues to work toward goals.  No new concerns today.  Denies n/v/f/c pain.      4/7/23 Patient has no new complaints today. Compliant with therapies and requires partial to mod assistance with most activies. She continues to work toward goals.        4/10/23 Patient with UR would like to have lorenz catheter removed and attempt voiding trial. Denies ABD discomfort or burning with urination. Patient requires moderate assistance for transfers and ADL's.       Objective   Vital signs: 145/78, 98, 18, 98%    Physical Exam  Constitutional:       General: She is not in acute distress.  Eyes:      Extraocular Movements: Extraocular movements intact.   Pulmonary:      Effort: Pulmonary effort is normal.      Breath sounds: Normal breath sounds.   Abdominal:      General: Bowel sounds are normal.      Palpations: Abdomen is soft.   Genitourinary:     Comments: Lorenz catheter  Musculoskeletal:      Cervical back: Neck supple.      Right lower leg: No edema.      Left lower leg: No edema.   Neurological:      Mental  Status: She is alert.   Psychiatric:         Mood and Affect: Mood normal.         Behavior: Behavior is cooperative.         Assessment/Plan   Problem List Items Addressed This Visit          Circulatory    Chronic diastolic heart failure (CMS/HCC)     Stable  Diuretic  Monitor weight            Genitourinary    Urinary retention     Remove Bradley cath  Initiate voiding trial            Other    Weakness       Continue therapy          Medications, treatments, and labs reviewed  Continue medications and treatments as listed in P[facility]    Scribe Attestation  By signing my name below, I, Noe Zamora   attest that this documentation has been prepared under the direction and in the presence of Keely Braden MD.    Provider Attestation - Scribe documentation  All medical record entries made by the Scribe were at my direction and personally dictated by me. I have reviewed the chart and agree that the record accurately reflects my personal performance of the history, physical exam, discussion and plan.

## 2023-04-12 ENCOUNTER — NURSING HOME VISIT (OUTPATIENT)
Dept: POST ACUTE CARE | Facility: EXTERNAL LOCATION | Age: 88
End: 2023-04-12
Payer: MEDICARE

## 2023-04-12 DIAGNOSIS — I50.32 CHRONIC DIASTOLIC HEART FAILURE (MULTI): ICD-10-CM

## 2023-04-12 DIAGNOSIS — G30.1 MILD LATE ONSET ALZHEIMER'S DEMENTIA WITHOUT BEHAVIORAL DISTURBANCE, PSYCHOTIC DISTURBANCE, MOOD DISTURBANCE, OR ANXIETY (MULTI): ICD-10-CM

## 2023-04-12 DIAGNOSIS — I10 HYPERTENSION, ESSENTIAL: ICD-10-CM

## 2023-04-12 DIAGNOSIS — R63.4 WEIGHT LOSS: Primary | ICD-10-CM

## 2023-04-12 DIAGNOSIS — K81.1 CHRONIC CHOLECYSTITIS: ICD-10-CM

## 2023-04-12 DIAGNOSIS — R53.1 WEAKNESS: ICD-10-CM

## 2023-04-12 DIAGNOSIS — F02.A0 MILD LATE ONSET ALZHEIMER'S DEMENTIA WITHOUT BEHAVIORAL DISTURBANCE, PSYCHOTIC DISTURBANCE, MOOD DISTURBANCE, OR ANXIETY (MULTI): ICD-10-CM

## 2023-04-12 PROCEDURE — 99309 SBSQ NF CARE MODERATE MDM 30: CPT | Performed by: NURSE PRACTITIONER

## 2023-04-12 NOTE — LETTER
Patient: Gwen Fowler  : 1930    Encounter Date: 2023    PROGRESS NOTE    Subjective  Chief complaint: Gwen Fowler is a 89 y.o. female who is an acute skilled patient being seen and evaluated for weakness and depression    HPI:  3/22/23 patient admitted to SNF for therapy d/t weakness after recent hospitalization s/p fall.  During hospitalization patient had nausea vomiting and abdominal pain.  HIDA scan was performed and showed chronic cholecystitis and patient underwent laparoscopic cholecystectomy.  She had some left knee pain and underwent aspiration to rule out septic arthritis and recommendation was made for further work-up as outpatient.  She was given a left lower extremity immobilizer.  She states that she continues to have pain in multiple joints which is not new.  Pain medication is somewhat effective.  Patient has been working in therapy and is working on transfers.  She requires maximum assist for stand pivot transfer and maximum assist for sit to stand at front wheeled walker.    3/23/23 patient continues to work in therapy and is working on safe transfers.  She requires maximum assist for transfers maximum assist for sit to stand at front wheeled walker.  No new concerns or complaints.  No acute distress.    3/24/2023 patient continues working in therapy.  She requires moderate assist for sit to stand transfers.  She is working in speech therapy as well.  Nurse reporting patient with nausea and vomiting after meals.  She was followed by GI in the hospital and treated with IV Protonix and IV Zofran.  Patient is currently on PPI.  She is having bowel movements according to the nurse.    3/27/23 Patient with Dementia continues to work in speech, physical and occupational therapy. She requires moderate assistance for moderate assistance for transfers. Nurse reports that she has had weight loss and dietician will evaluate.  Presently denies nausea or vomiting, fever, chills or diarrhea.  In addition her BS was low at 42, she takes metformin once daily for DM. BMP was obtained and revealed H&H 7.4 and 12.5. No other concerns today.     3/29/2023 patient is working on transfers in therapy and continues to work toward goals.  She has no new concerns today.  Denies constitutional symptoms.    3/30/23 Patient working in therapy due to weakness and debility. Patient requires moderate assistance for transfers. Denies pain at this time. No acute distress or new concerns. Denies SOB or unexplained weight gain.     3/31/2023 patient with no new concerns today.  She continues to work towards goals in therapy.  Requires wheelchair for mobility.  Denies constitutional symptoms.    4/4/23  patient continues to work in therapy due to weakness.  She is working on strengthening and requires a wheelchair for mobility.  No new issues or concerns.  No acute distress.  Denies shortness of breath.    4/5/23  Patient has been working in therapy to improve strength, endurance, and ADLs.  Patient continues to work toward goals.  No new concerns today.  Denies n/v/f/c pain.      4/7/23 Patient has no new complaints today. Compliant with therapies and requires partial to mod assistance with most activies. She continues to work toward goals.      4/10/23 Patient with UR would like to have lorenz catheter removed and attempt voiding trial. Denies ABD discomfort or burning with urination. Patient requires moderate assistance for transfers and ADL's.     4/12/2023 patient has been working with PT and OT.  She requires minimal to moderate assist for sit to stand transfers.  Patient is able to ambulate 12 feet with front wheeled walker with contact-guard to minimal assist.  Nursing staff reports that patient has had poor appetite weight loss and tearfulness at times.  Patient denies signs symptoms and denies feeling down.  Weight has gone down 24 pounds since November however patient does have chronic cholecystitis.  She denies  abdominal pain nausea vomiting fever chills.  She is followed by dietitian and on supplements.      Objective  Vital signs: 130/63    Physical Exam  Constitutional:       General: She is not in acute distress.  Eyes:      Extraocular Movements: Extraocular movements intact.   Cardiovascular:      Rate and Rhythm: Regular rhythm.   Pulmonary:      Effort: Pulmonary effort is normal.      Breath sounds: Normal breath sounds.   Abdominal:      General: Bowel sounds are normal.      Palpations: Abdomen is soft.   Musculoskeletal:      Cervical back: Neck supple.      Right lower leg: No edema.      Left lower leg: No edema.      Comments: Generalized weakness   Neurological:      Mental Status: She is alert.   Psychiatric:         Mood and Affect: Mood normal.         Behavior: Behavior is cooperative.         Assessment/Plan  Problem List Items Addressed This Visit       Chronic cholecystitis     Status post laparoscopic cholecystectomy  No n/v today  Cont to monitor         Chronic diastolic heart failure (CMS/HCC)     Stable, no sob   Cont Diuretic  Monitor weight         Hypertension, essential     Controlled, BP at goal  Continue antihypertensives  Continue to monitor blood pressure         Mild late onset Alzheimer's dementia without behavioral disturbance, psychotic disturbance, mood disturbance, or anxiety (CMS/HCC)    Weakness     Continue with therapy         Weight loss - Primary     Dietitian following  Continue to monitor weight  Continue supplements        ?Depression -patient denies feeling down, will continue to monitor  Medications, treatments, and labs reviewed  Continue medications and treatments as listed in [facility]    KAIDEN Banda      Electronically Signed By: KAIDEN Banda   4/12/23  1:33 PM

## 2023-04-12 NOTE — PROGRESS NOTES
PROGRESS NOTE    Subjective   Chief complaint: Gwen Fowler is a 89 y.o. female who is an acute skilled patient being seen and evaluated for weakness and depression    HPI:  3/22/23 patient admitted to SNF for therapy d/t weakness after recent hospitalization s/p fall.  During hospitalization patient had nausea vomiting and abdominal pain.  HIDA scan was performed and showed chronic cholecystitis and patient underwent laparoscopic cholecystectomy.  She had some left knee pain and underwent aspiration to rule out septic arthritis and recommendation was made for further work-up as outpatient.  She was given a left lower extremity immobilizer.  She states that she continues to have pain in multiple joints which is not new.  Pain medication is somewhat effective.  Patient has been working in therapy and is working on transfers.  She requires maximum assist for stand pivot transfer and maximum assist for sit to stand at front wheeled walker.    3/23/23 patient continues to work in therapy and is working on safe transfers.  She requires maximum assist for transfers maximum assist for sit to stand at front wheeled walker.  No new concerns or complaints.  No acute distress.    3/24/2023 patient continues working in therapy.  She requires moderate assist for sit to stand transfers.  She is working in speech therapy as well.  Nurse reporting patient with nausea and vomiting after meals.  She was followed by GI in the hospital and treated with IV Protonix and IV Zofran.  Patient is currently on PPI.  She is having bowel movements according to the nurse.    3/27/23 Patient with Dementia continues to work in speech, physical and occupational therapy. She requires moderate assistance for moderate assistance for transfers. Nurse reports that she has had weight loss and dietician will evaluate.  Presently denies nausea or vomiting, fever, chills or diarrhea. In addition her BS was low at 42, she takes metformin once daily for  DM. BMP was obtained and revealed H&H 7.4 and 12.5. No other concerns today.     3/29/2023 patient is working on transfers in therapy and continues to work toward goals.  She has no new concerns today.  Denies constitutional symptoms.    3/30/23 Patient working in therapy due to weakness and debility. Patient requires moderate assistance for transfers. Denies pain at this time. No acute distress or new concerns. Denies SOB or unexplained weight gain.     3/31/2023 patient with no new concerns today.  She continues to work towards goals in therapy.  Requires wheelchair for mobility.  Denies constitutional symptoms.    4/4/23  patient continues to work in therapy due to weakness.  She is working on strengthening and requires a wheelchair for mobility.  No new issues or concerns.  No acute distress.  Denies shortness of breath.    4/5/23  Patient has been working in therapy to improve strength, endurance, and ADLs.  Patient continues to work toward goals.  No new concerns today.  Denies n/v/f/c pain.      4/7/23 Patient has no new complaints today. Compliant with therapies and requires partial to mod assistance with most activies. She continues to work toward goals.      4/10/23 Patient with UR would like to have lorenz catheter removed and attempt voiding trial. Denies ABD discomfort or burning with urination. Patient requires moderate assistance for transfers and ADL's.     4/12/2023 patient has been working with PT and OT.  She requires minimal to moderate assist for sit to stand transfers.  Patient is able to ambulate 12 feet with front wheeled walker with contact-guard to minimal assist.  Nursing staff reports that patient has had poor appetite weight loss and tearfulness at times.  Patient denies signs symptoms and denies feeling down.  Weight has gone down 24 pounds since November however patient does have chronic cholecystitis.  She denies abdominal pain nausea vomiting fever chills.  She is followed by  dietitian and on supplements.      Objective   Vital signs: 130/63    Physical Exam  Constitutional:       General: She is not in acute distress.  Eyes:      Extraocular Movements: Extraocular movements intact.   Cardiovascular:      Rate and Rhythm: Regular rhythm.   Pulmonary:      Effort: Pulmonary effort is normal.      Breath sounds: Normal breath sounds.   Abdominal:      General: Bowel sounds are normal.      Palpations: Abdomen is soft.   Musculoskeletal:      Cervical back: Neck supple.      Right lower leg: No edema.      Left lower leg: No edema.      Comments: Generalized weakness   Neurological:      Mental Status: She is alert.   Psychiatric:         Mood and Affect: Mood normal.         Behavior: Behavior is cooperative.         Assessment/Plan   Problem List Items Addressed This Visit       Chronic cholecystitis     Status post laparoscopic cholecystectomy  No n/v today  Cont to monitor         Chronic diastolic heart failure (CMS/HCC)     Stable, no sob   Cont Diuretic  Monitor weight         Hypertension, essential     Controlled, BP at goal  Continue antihypertensives  Continue to monitor blood pressure         Mild late onset Alzheimer's dementia without behavioral disturbance, psychotic disturbance, mood disturbance, or anxiety (CMS/HCC)    Weakness     Continue with therapy         Weight loss - Primary     Dietitian following  Continue to monitor weight  Continue supplements        ?Depression -patient denies feeling down, will continue to monitor  Medications, treatments, and labs reviewed  Continue medications and treatments as listed in PCC    Tami Ga, APRN-CNP

## 2023-04-13 ENCOUNTER — NURSING HOME VISIT (OUTPATIENT)
Dept: POST ACUTE CARE | Facility: EXTERNAL LOCATION | Age: 88
End: 2023-04-13
Payer: MEDICARE

## 2023-04-13 DIAGNOSIS — R53.1 WEAKNESS: ICD-10-CM

## 2023-04-13 DIAGNOSIS — R33.9 URINARY RETENTION: ICD-10-CM

## 2023-04-13 DIAGNOSIS — I50.32 CHRONIC DIASTOLIC HEART FAILURE (MULTI): ICD-10-CM

## 2023-04-13 PROCEDURE — 99308 SBSQ NF CARE LOW MDM 20: CPT | Performed by: INTERNAL MEDICINE

## 2023-04-13 NOTE — LETTER
Patient: Gwen Fowler  : 1930    Encounter Date: 2023    PROGRESS NOTE    Subjective  Chief complaint: Gwen Fowler is a 89 y.o. female who is an acute skilled patient being seen and evaluated for weakness    HPI:  3/22/23 patient admitted to SNF for therapy d/t weakness after recent hospitalization s/p fall.  During hospitalization patient had nausea vomiting and abdominal pain.  HIDA scan was performed and showed chronic cholecystitis and patient underwent laparoscopic cholecystectomy.  She had some left knee pain and underwent aspiration to rule out septic arthritis and recommendation was made for further work-up as outpatient.  She was given a left lower extremity immobilizer.  She states that she continues to have pain in multiple joints which is not new.  Pain medication is somewhat effective.  Patient has been working in therapy and is working on transfers.  She requires maximum assist for stand pivot transfer and maximum assist for sit to stand at front wheeled walker.    3/23/23 patient continues to work in therapy and is working on safe transfers.  She requires maximum assist for transfers maximum assist for sit to stand at front wheeled walker.  No new concerns or complaints.  No acute distress.    3/24/2023 patient continues working in therapy.  She requires moderate assist for sit to stand transfers.  She is working in speech therapy as well.  Nurse reporting patient with nausea and vomiting after meals.  She was followed by GI in the hospital and treated with IV Protonix and IV Zofran.  Patient is currently on PPI.  She is having bowel movements according to the nurse.    3/27/23 Patient with Dementia continues to work in speech, physical and occupational therapy. She requires moderate assistance for moderate assistance for transfers. Nurse reports that she has had weight loss and dietician will evaluate.  Presently denies nausea or vomiting, fever, chills or diarrhea. In addition  her BS was low at 42, she takes metformin once daily for DM. BMP was obtained and revealed H&H 7.4 and 12.5. No other concerns today.     3/29/2023 patient is working on transfers in therapy and continues to work toward goals.  She has no new concerns today.  Denies constitutional symptoms.    3/30/23 Patient working in therapy due to weakness and debility. Patient requires moderate assistance for transfers. Denies pain at this time. No acute distress or new concerns. Denies SOB or unexplained weight gain.     3/31/2023 patient with no new concerns today.  She continues to work towards goals in therapy.  Requires wheelchair for mobility.  Denies constitutional symptoms.    4/4/23  patient continues to work in therapy due to weakness.  She is working on strengthening and requires a wheelchair for mobility.  No new issues or concerns.  No acute distress.  Denies shortness of breath.    4/5/23  Patient has been working in therapy to improve strength, endurance, and ADLs.  Patient continues to work toward goals.  No new concerns today.  Denies n/v/f/c pain.      4/7/23 Patient has no new complaints today. Compliant with therapies and requires partial to mod assistance with most activies. She continues to work toward goals.      4/10/23 Patient with UR would like to have lorenz catheter removed and attempt voiding trial. Denies ABD discomfort or burning with urination. Patient requires moderate assistance for transfers and ADL's.     4/12/2023 patient has been working with PT and OT.  She requires minimal to moderate assist for sit to stand transfers.  Patient is able to ambulate 12 feet with front wheeled walker with contact-guard to minimal assist.  Nursing staff reports that patient has had poor appetite weight loss and tearfulness at times.  Patient denies signs symptoms and denies feeling down.  Weight has gone down 24 pounds since November however patient does have chronic cholecystitis.  She denies abdominal pain  nausea vomiting fever chills.  She is followed by dietitian and on supplements.    4/13/23  patient continues to work with physical therapy and occupational therapies.  She does ambulate over 10 feet with a wheeled walker and contact-guard to minimal assistance.  Patient Bradley was removed and she continues to urinate successfully on her own.  Denies burning with urination or abdominal discomfort.  No acute distress.   Denies shortness of breath or orthopnea.      Objective  Vital signs:   144/98, 95%    Physical Exam  Constitutional:       General: She is not in acute distress.  Eyes:      Extraocular Movements: Extraocular movements intact.   Cardiovascular:      Rate and Rhythm: Normal rate and regular rhythm.   Pulmonary:      Effort: Pulmonary effort is normal.      Breath sounds: Normal breath sounds.   Abdominal:      General: Bowel sounds are normal.      Palpations: Abdomen is soft.   Musculoskeletal:         General: Normal range of motion.      Cervical back: Normal range of motion and neck supple.      Right lower leg: No edema.      Left lower leg: No edema.   Neurological:      Mental Status: She is alert.   Psychiatric:         Mood and Affect: Mood normal.         Behavior: Behavior is cooperative.         Assessment/Plan  Problem List Items Addressed This Visit          Circulatory    Chronic diastolic heart failure (CMS/HCC)     Stable, no sob   Cont Diuretic  Monitor weight            Genitourinary    Urinary retention     Urinating well without Bradley cath            Other    Weakness     Continue with therapy          Medications, treatments, and labs reviewed  Continue medications and treatments as listed in [facility]    Keely Braden MD    1. Chronic diastolic heart failure (CMS/HCC)        2. Urinary retention        3. Weakness             Scribe Attestation  By signing my name below, I, Isabel Horne, [employer]   attest that this documentation has been prepared under the direction and in the  presence of Keely Braden MD.    Provider Attestation - Scribe documentation  All medical record entries made by the Scribe were at my direction and personally dictated by me. I have reviewed the chart and agree that the record accurately reflects my personal performance of the history, physical exam, discussion and plan.      Electronically Signed By: Keely Braden MD   4/18/23  6:46 PM

## 2023-04-13 NOTE — PROGRESS NOTES
PROGRESS NOTE    Subjective   Chief complaint: Gwen Fowler is a 89 y.o. female who is an acute skilled patient being seen and evaluated for weakness    HPI:  3/22/23 patient admitted to SNF for therapy d/t weakness after recent hospitalization s/p fall.  During hospitalization patient had nausea vomiting and abdominal pain.  HIDA scan was performed and showed chronic cholecystitis and patient underwent laparoscopic cholecystectomy.  She had some left knee pain and underwent aspiration to rule out septic arthritis and recommendation was made for further work-up as outpatient.  She was given a left lower extremity immobilizer.  She states that she continues to have pain in multiple joints which is not new.  Pain medication is somewhat effective.  Patient has been working in therapy and is working on transfers.  She requires maximum assist for stand pivot transfer and maximum assist for sit to stand at front wheeled walker.    3/23/23 patient continues to work in therapy and is working on safe transfers.  She requires maximum assist for transfers maximum assist for sit to stand at front wheeled walker.  No new concerns or complaints.  No acute distress.    3/24/2023 patient continues working in therapy.  She requires moderate assist for sit to stand transfers.  She is working in speech therapy as well.  Nurse reporting patient with nausea and vomiting after meals.  She was followed by GI in the hospital and treated with IV Protonix and IV Zofran.  Patient is currently on PPI.  She is having bowel movements according to the nurse.    3/27/23 Patient with Dementia continues to work in speech, physical and occupational therapy. She requires moderate assistance for moderate assistance for transfers. Nurse reports that she has had weight loss and dietician will evaluate.  Presently denies nausea or vomiting, fever, chills or diarrhea. In addition her BS was low at 42, she takes metformin once daily for DM. BMP was  obtained and revealed H&H 7.4 and 12.5. No other concerns today.     3/29/2023 patient is working on transfers in therapy and continues to work toward goals.  She has no new concerns today.  Denies constitutional symptoms.    3/30/23 Patient working in therapy due to weakness and debility. Patient requires moderate assistance for transfers. Denies pain at this time. No acute distress or new concerns. Denies SOB or unexplained weight gain.     3/31/2023 patient with no new concerns today.  She continues to work towards goals in therapy.  Requires wheelchair for mobility.  Denies constitutional symptoms.    4/4/23  patient continues to work in therapy due to weakness.  She is working on strengthening and requires a wheelchair for mobility.  No new issues or concerns.  No acute distress.  Denies shortness of breath.    4/5/23  Patient has been working in therapy to improve strength, endurance, and ADLs.  Patient continues to work toward goals.  No new concerns today.  Denies n/v/f/c pain.      4/7/23 Patient has no new complaints today. Compliant with therapies and requires partial to mod assistance with most activies. She continues to work toward goals.        4/10/23 Patient with UR would like to have lorenz catheter removed and attempt voiding trial. Denies ABD discomfort or burning with urination. Patient requires moderate assistance for transfers and ADL's.     4/11/23 Patient working in therapy and requires moderate assistance for transfers. She had Lorenz removed yesterday and is still voiding well without it. Denies burning or pressure. Denies SOB. No acute distress.       Objective   Vital signs: 139/56, 96%    Physical Exam  Constitutional:       General: She is not in acute distress.  Eyes:      Extraocular Movements: Extraocular movements intact.   Cardiovascular:      Rate and Rhythm: Normal rate and regular rhythm.   Pulmonary:      Effort: Pulmonary effort is normal.      Breath sounds: Normal breath  sounds.   Abdominal:      General: Bowel sounds are normal.      Palpations: Abdomen is soft.   Musculoskeletal:         General: Normal range of motion.      Cervical back: Normal range of motion and neck supple.      Right lower leg: No edema.      Left lower leg: No edema.   Neurological:      Mental Status: She is alert.   Psychiatric:         Mood and Affect: Mood normal.         Behavior: Behavior is cooperative.         Assessment/Plan   Problem List Items Addressed This Visit          Circulatory    Chronic diastolic heart failure (CMS/HCC)     Stable, no sob   Cont Diuretic  Monitor weight            Genitourinary    Urinary retention     Urinating well without Bradley cath            Other    Weakness     Continue with therapy          Medications, treatments, and labs reviewed  Continue medications and treatments as listed in PCC    Scribe Attestation  By signing my name below, I, Noe Zamora   attest that this documentation has been prepared under the direction and in the presence of Keely Braden MD.    Provider Attestation - Scribe documentation  All medical record entries made by the Scribe were at my direction and personally dictated by me. I have reviewed the chart and agree that the record accurately reflects my personal performance of the history, physical exam, discussion and plan.

## 2023-04-14 ENCOUNTER — NURSING HOME VISIT (OUTPATIENT)
Dept: POST ACUTE CARE | Facility: EXTERNAL LOCATION | Age: 88
End: 2023-04-14
Payer: MEDICARE

## 2023-04-14 DIAGNOSIS — G30.1 MILD LATE ONSET ALZHEIMER'S DEMENTIA WITHOUT BEHAVIORAL DISTURBANCE, PSYCHOTIC DISTURBANCE, MOOD DISTURBANCE, OR ANXIETY (MULTI): ICD-10-CM

## 2023-04-14 DIAGNOSIS — F02.A0 MILD LATE ONSET ALZHEIMER'S DEMENTIA WITHOUT BEHAVIORAL DISTURBANCE, PSYCHOTIC DISTURBANCE, MOOD DISTURBANCE, OR ANXIETY (MULTI): ICD-10-CM

## 2023-04-14 DIAGNOSIS — R53.1 WEAKNESS: Primary | ICD-10-CM

## 2023-04-14 DIAGNOSIS — I50.32 CHRONIC DIASTOLIC HEART FAILURE (MULTI): ICD-10-CM

## 2023-04-14 PROCEDURE — 99308 SBSQ NF CARE LOW MDM 20: CPT | Performed by: NURSE PRACTITIONER

## 2023-04-14 NOTE — LETTER
Patient: Gwen Fowler  : 1930    Encounter Date: 2023    PROGRESS NOTE    Subjective  Chief complaint: Gwen Fowler is a 89 y.o. female who is an acute skilled patient being seen and evaluated for weakness    HPI:  3/22/23 patient admitted to SNF for therapy d/t weakness after recent hospitalization s/p fall.  During hospitalization patient had nausea vomiting and abdominal pain.  HIDA scan was performed and showed chronic cholecystitis and patient underwent laparoscopic cholecystectomy.  She had some left knee pain and underwent aspiration to rule out septic arthritis and recommendation was made for further work-up as outpatient.  She was given a left lower extremity immobilizer.  She states that she continues to have pain in multiple joints which is not new.  Pain medication is somewhat effective.  Patient has been working in therapy and is working on transfers.  She requires maximum assist for stand pivot transfer and maximum assist for sit to stand at front wheeled walker.    3/23/23 patient continues to work in therapy and is working on safe transfers.  She requires maximum assist for transfers maximum assist for sit to stand at front wheeled walker.  No new concerns or complaints.  No acute distress.    3/24/2023 patient continues working in therapy.  She requires moderate assist for sit to stand transfers.  She is working in speech therapy as well.  Nurse reporting patient with nausea and vomiting after meals.  She was followed by GI in the hospital and treated with IV Protonix and IV Zofran.  Patient is currently on PPI.  She is having bowel movements according to the nurse.    3/27/23 Patient with Dementia continues to work in speech, physical and occupational therapy. She requires moderate assistance for moderate assistance for transfers. Nurse reports that she has had weight loss and dietician will evaluate.  Presently denies nausea or vomiting, fever, chills or diarrhea. In addition  her BS was low at 42, she takes metformin once daily for DM. BMP was obtained and revealed H&H 7.4 and 12.5. No other concerns today.     3/29/2023 patient is working on transfers in therapy and continues to work toward goals.  She has no new concerns today.  Denies constitutional symptoms.    3/30/23 Patient working in therapy due to weakness and debility. Patient requires moderate assistance for transfers. Denies pain at this time. No acute distress or new concerns. Denies SOB or unexplained weight gain.     3/31/2023 patient with no new concerns today.  She continues to work towards goals in therapy.  Requires wheelchair for mobility.  Denies constitutional symptoms.    4/4/23  patient continues to work in therapy due to weakness.  She is working on strengthening and requires a wheelchair for mobility.  No new issues or concerns.  No acute distress.  Denies shortness of breath.    4/5/23  Patient has been working in therapy to improve strength, endurance, and ADLs.  Patient continues to work toward goals.  No new concerns today.  Denies n/v/f/c pain.      4/7/23 Patient has no new complaints today. Compliant with therapies and requires partial to mod assistance with most activies. She continues to work toward goals.      4/10/23 Patient with UR would like to have lorenz catheter removed and attempt voiding trial. Denies ABD discomfort or burning with urination. Patient requires moderate assistance for transfers and ADL's.     4/12/2023 patient has been working with PT and OT.  She requires minimal to moderate assist for sit to stand transfers.  Patient is able to ambulate 12 feet with front wheeled walker with contact-guard to minimal assist.  Nursing staff reports that patient has had poor appetite weight loss and tearfulness at times.  Patient denies signs symptoms and denies feeling down.  Weight has gone down 24 pounds since November however patient does have chronic cholecystitis.  She denies abdominal pain  nausea vomiting fever chills.  She is followed by dietitian and on supplements.    4/14/23 patient in therapy d/t generalized weakness.  Patient presents for f/u.  Continues to work toward goals in therapy.  No new complaints at this time.  States she feels good      Objective  Physical Exam  Constitutional:       General: She is not in acute distress.  Eyes:      Extraocular Movements: Extraocular movements intact.   Cardiovascular:      Rate and Rhythm: Regular rhythm.   Pulmonary:      Effort: Pulmonary effort is normal.      Breath sounds: Normal breath sounds.   Abdominal:      General: Bowel sounds are normal.      Palpations: Abdomen is soft.   Musculoskeletal:      Cervical back: Neck supple.      Right lower leg: No edema.      Left lower leg: No edema.      Comments: Generalized weakness   Neurological:      Mental Status: She is alert.   Psychiatric:         Mood and Affect: Mood normal.         Behavior: Behavior is cooperative.         Assessment/Plan  Problem List Items Addressed This Visit       Chronic diastolic heart failure (CMS/HCC)     Stable, no sob   Cont Diuretic  Monitor weight         Mild late onset Alzheimer's dementia without behavioral disturbance, psychotic disturbance, mood disturbance, or anxiety (CMS/HCC)    Weakness - Primary     Continue with therapy          Medications, treatments, and labs reviewed  Continue medications and treatments as listed in [facility]    KAIDEN Banda      Electronically Signed By: KAIDEN Banda   4/18/23  3:27 PM

## 2023-04-14 NOTE — PROGRESS NOTES
PROGRESS NOTE    Subjective   Chief complaint: Gwen Fowler is a 89 y.o. female who is an acute skilled patient being seen and evaluated for weakness    HPI:  3/22/23 patient admitted to SNF for therapy d/t weakness after recent hospitalization s/p fall.  During hospitalization patient had nausea vomiting and abdominal pain.  HIDA scan was performed and showed chronic cholecystitis and patient underwent laparoscopic cholecystectomy.  She had some left knee pain and underwent aspiration to rule out septic arthritis and recommendation was made for further work-up as outpatient.  She was given a left lower extremity immobilizer.  She states that she continues to have pain in multiple joints which is not new.  Pain medication is somewhat effective.  Patient has been working in therapy and is working on transfers.  She requires maximum assist for stand pivot transfer and maximum assist for sit to stand at front wheeled walker.    3/23/23 patient continues to work in therapy and is working on safe transfers.  She requires maximum assist for transfers maximum assist for sit to stand at front wheeled walker.  No new concerns or complaints.  No acute distress.    3/24/2023 patient continues working in therapy.  She requires moderate assist for sit to stand transfers.  She is working in speech therapy as well.  Nurse reporting patient with nausea and vomiting after meals.  She was followed by GI in the hospital and treated with IV Protonix and IV Zofran.  Patient is currently on PPI.  She is having bowel movements according to the nurse.    3/27/23 Patient with Dementia continues to work in speech, physical and occupational therapy. She requires moderate assistance for moderate assistance for transfers. Nurse reports that she has had weight loss and dietician will evaluate.  Presently denies nausea or vomiting, fever, chills or diarrhea. In addition her BS was low at 42, she takes metformin once daily for DM. BMP was  obtained and revealed H&H 7.4 and 12.5. No other concerns today.     3/29/2023 patient is working on transfers in therapy and continues to work toward goals.  She has no new concerns today.  Denies constitutional symptoms.    3/30/23 Patient working in therapy due to weakness and debility. Patient requires moderate assistance for transfers. Denies pain at this time. No acute distress or new concerns. Denies SOB or unexplained weight gain.     3/31/2023 patient with no new concerns today.  She continues to work towards goals in therapy.  Requires wheelchair for mobility.  Denies constitutional symptoms.    4/4/23  patient continues to work in therapy due to weakness.  She is working on strengthening and requires a wheelchair for mobility.  No new issues or concerns.  No acute distress.  Denies shortness of breath.    4/5/23  Patient has been working in therapy to improve strength, endurance, and ADLs.  Patient continues to work toward goals.  No new concerns today.  Denies n/v/f/c pain.      4/7/23 Patient has no new complaints today. Compliant with therapies and requires partial to mod assistance with most activies. She continues to work toward goals.      4/10/23 Patient with UR would like to have lorenz catheter removed and attempt voiding trial. Denies ABD discomfort or burning with urination. Patient requires moderate assistance for transfers and ADL's.     4/12/2023 patient has been working with PT and OT.  She requires minimal to moderate assist for sit to stand transfers.  Patient is able to ambulate 12 feet with front wheeled walker with contact-guard to minimal assist.  Nursing staff reports that patient has had poor appetite weight loss and tearfulness at times.  Patient denies signs symptoms and denies feeling down.  Weight has gone down 24 pounds since November however patient does have chronic cholecystitis.  She denies abdominal pain nausea vomiting fever chills.  She is followed by dietitian and on  supplements.    4/14/23 patient in therapy d/t generalized weakness.  Patient presents for f/u.  Continues to work toward goals in therapy.  No new complaints at this time.  States she feels good      Objective   Physical Exam  Constitutional:       General: She is not in acute distress.  Eyes:      Extraocular Movements: Extraocular movements intact.   Cardiovascular:      Rate and Rhythm: Regular rhythm.   Pulmonary:      Effort: Pulmonary effort is normal.      Breath sounds: Normal breath sounds.   Abdominal:      General: Bowel sounds are normal.      Palpations: Abdomen is soft.   Musculoskeletal:      Cervical back: Neck supple.      Right lower leg: No edema.      Left lower leg: No edema.      Comments: Generalized weakness   Neurological:      Mental Status: She is alert.   Psychiatric:         Mood and Affect: Mood normal.         Behavior: Behavior is cooperative.         Assessment/Plan   Problem List Items Addressed This Visit       Chronic diastolic heart failure (CMS/HCC)     Stable, no sob   Cont Diuretic  Monitor weight         Mild late onset Alzheimer's dementia without behavioral disturbance, psychotic disturbance, mood disturbance, or anxiety (CMS/HCC)    Weakness - Primary     Continue with therapy          Medications, treatments, and labs reviewed  Continue medications and treatments as listed in PCC    MEE Banda-CNP

## 2023-04-17 ENCOUNTER — NURSING HOME VISIT (OUTPATIENT)
Dept: POST ACUTE CARE | Facility: EXTERNAL LOCATION | Age: 88
End: 2023-04-17
Payer: MEDICARE

## 2023-04-17 DIAGNOSIS — R53.1 WEAKNESS: ICD-10-CM

## 2023-04-17 DIAGNOSIS — I50.32 CHRONIC DIASTOLIC HEART FAILURE (MULTI): ICD-10-CM

## 2023-04-17 PROCEDURE — 99308 SBSQ NF CARE LOW MDM 20: CPT | Performed by: INTERNAL MEDICINE

## 2023-04-17 NOTE — LETTER
Patient: Gwen Fowler  : 1930    Encounter Date: 2023    PROGRESS NOTE    Subjective  Chief complaint: Gwen Fowler is a 89 y.o. female who is an acute skilled patient being seen and evaluated for weakness    HPI:  3/22/23 patient admitted to SNF for therapy d/t weakness after recent hospitalization s/p fall.  During hospitalization patient had nausea vomiting and abdominal pain.  HIDA scan was performed and showed chronic cholecystitis and patient underwent laparoscopic cholecystectomy.  She had some left knee pain and underwent aspiration to rule out septic arthritis and recommendation was made for further work-up as outpatient.  She was given a left lower extremity immobilizer.  She states that she continues to have pain in multiple joints which is not new.  Pain medication is somewhat effective.  Patient has been working in therapy and is working on transfers.  She requires maximum assist for stand pivot transfer and maximum assist for sit to stand at front wheeled walker.    3/23/23 patient continues to work in therapy and is working on safe transfers.  She requires maximum assist for transfers maximum assist for sit to stand at front wheeled walker.  No new concerns or complaints.  No acute distress.    3/24/2023 patient continues working in therapy.  She requires moderate assist for sit to stand transfers.  She is working in speech therapy as well.  Nurse reporting patient with nausea and vomiting after meals.  She was followed by GI in the hospital and treated with IV Protonix and IV Zofran.  Patient is currently on PPI.  She is having bowel movements according to the nurse.    3/27/23 Patient with Dementia continues to work in speech, physical and occupational therapy. She requires moderate assistance for moderate assistance for transfers. Nurse reports that she has had weight loss and dietician will evaluate.  Presently denies nausea or vomiting, fever, chills or diarrhea. In addition  her BS was low at 42, she takes metformin once daily for DM. BMP was obtained and revealed H&H 7.4 and 12.5. No other concerns today.     3/29/2023 patient is working on transfers in therapy and continues to work toward goals.  She has no new concerns today.  Denies constitutional symptoms.    3/30/23 Patient working in therapy due to weakness and debility. Patient requires moderate assistance for transfers. Denies pain at this time. No acute distress or new concerns. Denies SOB or unexplained weight gain.     3/31/2023 patient with no new concerns today.  She continues to work towards goals in therapy.  Requires wheelchair for mobility.  Denies constitutional symptoms.    4/4/23  patient continues to work in therapy due to weakness.  She is working on strengthening and requires a wheelchair for mobility.  No new issues or concerns.  No acute distress.  Denies shortness of breath.    4/5/23  Patient has been working in therapy to improve strength, endurance, and ADLs.  Patient continues to work toward goals.  No new concerns today.  Denies n/v/f/c pain.      4/7/23 Patient has no new complaints today. Compliant with therapies and requires partial to mod assistance with most activies. She continues to work toward goals.      4/10/23 Patient with UR would like to have lorenz catheter removed and attempt voiding trial. Denies ABD discomfort or burning with urination. Patient requires moderate assistance for transfers and ADL's.     4/12/2023 patient has been working with PT and OT.  She requires minimal to moderate assist for sit to stand transfers.  Patient is able to ambulate 12 feet with front wheeled walker with contact-guard to minimal assist.  Nursing staff reports that patient has had poor appetite weight loss and tearfulness at times.  Patient denies signs symptoms and denies feeling down.  Weight has gone down 24 pounds since November however patient does have chronic cholecystitis.  She denies abdominal pain  nausea vomiting fever chills.  She is followed by dietitian and on supplements.    4/14/23 patient in therapy d/t generalized weakness.  Patient presents for f/u.  Continues to work toward goals in therapy.  No new complaints at this time.  States she feels good    4/17/23  nurse reports patient with increased leg pain.  Patient does have order for lateral Aspercreme which is effective when applied.  Therapy continues to work with patient.  Patient is ambulating over 10 feet with a walker and  contact-guard to minimal assistance.  No new issues or concerns.  No acute distress.   Denies shortness of breath orthopnea.      Objective  Vital signs:  137/72, 96%    Physical Exam  Constitutional:       General: She is not in acute distress.  Eyes:      Extraocular Movements: Extraocular movements intact.   Cardiovascular:      Rate and Rhythm: Normal rate and regular rhythm.   Pulmonary:      Effort: Pulmonary effort is normal.      Breath sounds: Normal breath sounds.   Abdominal:      General: Bowel sounds are normal.      Palpations: Abdomen is soft.   Musculoskeletal:         General: Normal range of motion.      Cervical back: Normal range of motion and neck supple.      Right lower leg: Edema present.      Left lower leg: Edema present.      Comments: BLE +pain   Neurological:      Mental Status: She is alert.   Psychiatric:         Mood and Affect: Mood normal.         Behavior: Behavior is cooperative.         Assessment/Plan  Problem List Items Addressed This Visit          Circulatory    Chronic diastolic heart failure (CMS/HCC)     Stable, no sob   Cont Diuretic  Monitor weight            Other    Weakness     Continue with therapy          Medications, treatments, and labs reviewed  Continue medications and treatments as listed in [facility]    Keely Braden MD    1. Chronic diastolic heart failure (CMS/HCC)        2. Weakness             Scribe Attestation  By signing my name below, I, Noe Zamora    attest that this documentation has been prepared under the direction and in the presence of Keely Braden MD.    Provider Attestation - Scribe documentation  All medical record entries made by the Scribe were at my direction and personally dictated by me. I have reviewed the chart and agree that the record accurately reflects my personal performance of the history, physical exam, discussion and plan.      Electronically Signed By: Keely Braden MD   4/19/23  6:18 PM

## 2023-04-18 ENCOUNTER — NURSING HOME VISIT (OUTPATIENT)
Dept: POST ACUTE CARE | Facility: EXTERNAL LOCATION | Age: 88
End: 2023-04-18
Payer: MEDICARE

## 2023-04-18 DIAGNOSIS — R63.4 WEIGHT LOSS: ICD-10-CM

## 2023-04-18 DIAGNOSIS — M15.9 PRIMARY OSTEOARTHRITIS INVOLVING MULTIPLE JOINTS: ICD-10-CM

## 2023-04-18 DIAGNOSIS — I50.32 CHRONIC DIASTOLIC HEART FAILURE (MULTI): ICD-10-CM

## 2023-04-18 PROCEDURE — 99308 SBSQ NF CARE LOW MDM 20: CPT | Performed by: INTERNAL MEDICINE

## 2023-04-18 NOTE — PROGRESS NOTES
PROGRESS NOTE    Subjective   Chief complaint: Gwen Fowler is a 89 y.o. female who is an acute skilled patient being seen and evaluated for weakness    HPI:  3/22/23 patient admitted to SNF for therapy d/t weakness after recent hospitalization s/p fall.  During hospitalization patient had nausea vomiting and abdominal pain.  HIDA scan was performed and showed chronic cholecystitis and patient underwent laparoscopic cholecystectomy.  She had some left knee pain and underwent aspiration to rule out septic arthritis and recommendation was made for further work-up as outpatient.  She was given a left lower extremity immobilizer.  She states that she continues to have pain in multiple joints which is not new.  Pain medication is somewhat effective.  Patient has been working in therapy and is working on transfers.  She requires maximum assist for stand pivot transfer and maximum assist for sit to stand at front wheeled walker.    3/23/23 patient continues to work in therapy and is working on safe transfers.  She requires maximum assist for transfers maximum assist for sit to stand at front wheeled walker.  No new concerns or complaints.  No acute distress.    3/24/2023 patient continues working in therapy.  She requires moderate assist for sit to stand transfers.  She is working in speech therapy as well.  Nurse reporting patient with nausea and vomiting after meals.  She was followed by GI in the hospital and treated with IV Protonix and IV Zofran.  Patient is currently on PPI.  She is having bowel movements according to the nurse.    3/27/23 Patient with Dementia continues to work in speech, physical and occupational therapy. She requires moderate assistance for moderate assistance for transfers. Nurse reports that she has had weight loss and dietician will evaluate.  Presently denies nausea or vomiting, fever, chills or diarrhea. In addition her BS was low at 42, she takes metformin once daily for DM. BMP was  obtained and revealed H&H 7.4 and 12.5. No other concerns today.     3/29/2023 patient is working on transfers in therapy and continues to work toward goals.  She has no new concerns today.  Denies constitutional symptoms.    3/30/23 Patient working in therapy due to weakness and debility. Patient requires moderate assistance for transfers. Denies pain at this time. No acute distress or new concerns. Denies SOB or unexplained weight gain.     3/31/2023 patient with no new concerns today.  She continues to work towards goals in therapy.  Requires wheelchair for mobility.  Denies constitutional symptoms.    4/4/23  patient continues to work in therapy due to weakness.  She is working on strengthening and requires a wheelchair for mobility.  No new issues or concerns.  No acute distress.  Denies shortness of breath.    4/5/23  Patient has been working in therapy to improve strength, endurance, and ADLs.  Patient continues to work toward goals.  No new concerns today.  Denies n/v/f/c pain.      4/7/23 Patient has no new complaints today. Compliant with therapies and requires partial to mod assistance with most activies. She continues to work toward goals.      4/10/23 Patient with UR would like to have lorenz catheter removed and attempt voiding trial. Denies ABD discomfort or burning with urination. Patient requires moderate assistance for transfers and ADL's.     4/12/2023 patient has been working with PT and OT.  She requires minimal to moderate assist for sit to stand transfers.  Patient is able to ambulate 12 feet with front wheeled walker with contact-guard to minimal assist.  Nursing staff reports that patient has had poor appetite weight loss and tearfulness at times.  Patient denies signs symptoms and denies feeling down.  Weight has gone down 24 pounds since November however patient does have chronic cholecystitis.  She denies abdominal pain nausea vomiting fever chills.  She is followed by dietitian and on  supplements.    4/13/23  patient continues to work with physical therapy and occupational therapies.  She does ambulate over 10 feet with a wheeled walker and contact-guard to minimal assistance.  Patient Bradley was removed and she continues to urinate successfully on her own.  Denies burning with urination or abdominal discomfort.  No acute distress.   Denies shortness of breath or orthopnea.      Objective   Vital signs:   144/98, 95%    Physical Exam  Constitutional:       General: She is not in acute distress.  Eyes:      Extraocular Movements: Extraocular movements intact.   Cardiovascular:      Rate and Rhythm: Normal rate and regular rhythm.   Pulmonary:      Effort: Pulmonary effort is normal.      Breath sounds: Normal breath sounds.   Abdominal:      General: Bowel sounds are normal.      Palpations: Abdomen is soft.   Musculoskeletal:         General: Normal range of motion.      Cervical back: Normal range of motion and neck supple.      Right lower leg: No edema.      Left lower leg: No edema.   Neurological:      Mental Status: She is alert.   Psychiatric:         Mood and Affect: Mood normal.         Behavior: Behavior is cooperative.         Assessment/Plan   Problem List Items Addressed This Visit          Circulatory    Chronic diastolic heart failure (CMS/HCC)     Stable, no sob   Cont Diuretic  Monitor weight            Genitourinary    Urinary retention     Urinating well without Bradley cath            Other    Weakness     Continue with therapy          Medications, treatments, and labs reviewed  Continue medications and treatments as listed in PCC    Keely Braden MD    1. Chronic diastolic heart failure (CMS/HCC)        2. Urinary retention        3. Weakness             Scribe Attestation  By signing my name below, I, Isabeljermaine Guzmanh, Scribcodi   attest that this documentation has been prepared under the direction and in the presence of Keely Braden MD.    Provider Attestation - Scribe  documentation  All medical record entries made by the Scribe were at my direction and personally dictated by me. I have reviewed the chart and agree that the record accurately reflects my personal performance of the history, physical exam, discussion and plan.

## 2023-04-18 NOTE — LETTER
Patient: Gwen Fowler  : 1930    Encounter Date: 2023    PROGRESS NOTE    Subjective  Chief complaint: Gwen Fowler is a 89 y.o. female who is an acute skilled patient being seen and evaluated for weakness    HPI:   3/22/23 patient admitted to SNF for therapy d/t weakness after recent hospitalization s/p fall.  During hospitalization patient had nausea vomiting and abdominal pain.  HIDA scan was performed and showed chronic cholecystitis and patient underwent laparoscopic cholecystectomy.  She had some left knee pain and underwent aspiration to rule out septic arthritis and recommendation was made for further work-up as outpatient.  She was given a left lower extremity immobilizer.  She states that she continues to have pain in multiple joints which is not new.  Pain medication is somewhat effective.  Patient has been working in therapy and is working on transfers.  She requires maximum assist for stand pivot transfer and maximum assist for sit to stand at front wheeled walker.    3/23/23 patient continues to work in therapy and is working on safe transfers.  She requires maximum assist for transfers maximum assist for sit to stand at front wheeled walker.  No new concerns or complaints.  No acute distress.    3/24/2023 patient continues working in therapy.  She requires moderate assist for sit to stand transfers.  She is working in speech therapy as well.  Nurse reporting patient with nausea and vomiting after meals.  She was followed by GI in the hospital and treated with IV Protonix and IV Zofran.  Patient is currently on PPI.  She is having bowel movements according to the nurse.    3/27/23 Patient with Dementia continues to work in speech, physical and occupational therapy. She requires moderate assistance for moderate assistance for transfers. Nurse reports that she has had weight loss and dietician will evaluate.  Presently denies nausea or vomiting, fever, chills or diarrhea. In addition  her BS was low at 42, she takes metformin once daily for DM. BMP was obtained and revealed H&H 7.4 and 12.5. No other concerns today.     3/29/2023 patient is working on transfers in therapy and continues to work toward goals.  She has no new concerns today.  Denies constitutional symptoms.    3/30/23 Patient working in therapy due to weakness and debility. Patient requires moderate assistance for transfers. Denies pain at this time. No acute distress or new concerns. Denies SOB or unexplained weight gain.     3/31/2023 patient with no new concerns today.  She continues to work towards goals in therapy.  Requires wheelchair for mobility.  Denies constitutional symptoms.    4/4/23  patient continues to work in therapy due to weakness.  She is working on strengthening and requires a wheelchair for mobility.  No new issues or concerns.  No acute distress.  Denies shortness of breath.    4/5/23  Patient has been working in therapy to improve strength, endurance, and ADLs.  Patient continues to work toward goals.  No new concerns today.  Denies n/v/f/c pain.      4/7/23 Patient has no new complaints today. Compliant with therapies and requires partial to mod assistance with most activies. She continues to work toward goals.      4/10/23 Patient with UR would like to have lorenz catheter removed and attempt voiding trial. Denies ABD discomfort or burning with urination. Patient requires moderate assistance for transfers and ADL's.     4/12/2023 patient has been working with PT and OT.  She requires minimal to moderate assist for sit to stand transfers.  Patient is able to ambulate 12 feet with front wheeled walker with contact-guard to minimal assist.  Nursing staff reports that patient has had poor appetite weight loss and tearfulness at times.  Patient denies signs symptoms and denies feeling down.  Weight has gone down 24 pounds since November however patient does have chronic cholecystitis.  She denies abdominal pain  nausea vomiting fever chills.  She is followed by dietitian and on supplements.    4/14/23 patient in therapy d/t generalized weakness.  Patient presents for f/u.  Continues to work toward goals in therapy.  No new complaints at this time.  States she feels good    4/17/23  nurse reports patient with increased leg pain.  Patient does have order for lateral Aspercreme which is effective when applied.  Therapy continues to work with patient.  Patient is ambulating over 10 feet with a walker and  contact-guard to minimal assistance.  No new issues or concerns.  No acute distress.   Denies shortness of breath orthopnea.     4/18/23 patient has been working with therapy and doing well.  Weakness improving.   Pain controlled on current pain medications.  Denies constitutional symptoms.  Denies shortness of breath, orthopnea or   Increased edema.      Objective  Vital signs:   133/76, 98%    Physical Exam  Constitutional:       General: She is not in acute distress.  Eyes:      Extraocular Movements: Extraocular movements intact.   Cardiovascular:      Rate and Rhythm: Normal rate and regular rhythm.   Pulmonary:      Effort: Pulmonary effort is normal.      Breath sounds: Normal breath sounds.   Abdominal:      General: Bowel sounds are normal.      Palpations: Abdomen is soft.   Musculoskeletal:      Cervical back: Normal range of motion and neck supple.      Right lower leg: No edema.      Left lower leg: No edema.   Neurological:      Mental Status: She is alert.   Psychiatric:         Mood and Affect: Mood normal.         Behavior: Behavior is cooperative.         Assessment/Plan  Problem List Items Addressed This Visit          Circulatory    Chronic diastolic heart failure (CMS/HCC)     Stable, no sob   Cont Diuretic  Monitor weight            Musculoskeletal    Primary osteoarthritis involving multiple joints     Pain is controlled.  Continue PT OT.            Endocrine/Metabolic    Weight loss     Dietitian  following  Continue to monitor weight  Continue supplements          Medications, treatments, and labs reviewed  Continue medications and treatments as listed in PCC    Keely Braden MD    1. Chronic diastolic heart failure (CMS/[facility], located [facility])        2. Weight loss        3. Primary osteoarthritis involving multiple joints             Scribe Attestation  By signing my name below, I, Isabeljermaine GuzmanChristina estradaibcodi   attest that this documentation has been prepared under the direction and in the presence of Keely Braden MD.    Provider Attestation - Scribe documentation  All medical record entries made by the Scribe were at my direction and personally dictated by me. I have reviewed the chart and agree that the record accurately reflects my personal performance of the history, physical exam, discussion and plan.      Electronically Signed By: Keely Braden MD   4/20/23  6:10 PM

## 2023-04-19 ENCOUNTER — NURSING HOME VISIT (OUTPATIENT)
Dept: POST ACUTE CARE | Facility: EXTERNAL LOCATION | Age: 88
End: 2023-04-19
Payer: MEDICARE

## 2023-04-19 DIAGNOSIS — G30.1 MILD LATE ONSET ALZHEIMER'S DEMENTIA WITHOUT BEHAVIORAL DISTURBANCE, PSYCHOTIC DISTURBANCE, MOOD DISTURBANCE, OR ANXIETY (MULTI): ICD-10-CM

## 2023-04-19 DIAGNOSIS — F02.A0 MILD LATE ONSET ALZHEIMER'S DEMENTIA WITHOUT BEHAVIORAL DISTURBANCE, PSYCHOTIC DISTURBANCE, MOOD DISTURBANCE, OR ANXIETY (MULTI): ICD-10-CM

## 2023-04-19 DIAGNOSIS — I10 HYPERTENSION, ESSENTIAL: ICD-10-CM

## 2023-04-19 DIAGNOSIS — I50.32 CHRONIC DIASTOLIC HEART FAILURE (MULTI): ICD-10-CM

## 2023-04-19 DIAGNOSIS — R53.1 WEAKNESS: Primary | ICD-10-CM

## 2023-04-19 PROCEDURE — 99309 SBSQ NF CARE MODERATE MDM 30: CPT | Performed by: NURSE PRACTITIONER

## 2023-04-19 NOTE — PROGRESS NOTES
PROGRESS NOTE    Subjective   Chief complaint: Gwen Fowler is a 89 y.o. female who is a acute skilled care patient being seen and evaluated for weakness.    HPI:  3/22/23 patient admitted to SNF for therapy d/t weakness after recent hospitalization s/p fall.  During hospitalization patient had nausea vomiting and abdominal pain.  HIDA scan was performed and showed chronic cholecystitis and patient underwent laparoscopic cholecystectomy.  She had some left knee pain and underwent aspiration to rule out septic arthritis and recommendation was made for further work-up as outpatient.  She was given a left lower extremity immobilizer.  She states that she continues to have pain in multiple joints which is not new.  Pain medication is somewhat effective.  Patient has been working in therapy and is working on transfers.  She requires maximum assist for stand pivot transfer and maximum assist for sit to stand at front wheeled walker.    3/23/23 patient continues to work in therapy and is working on safe transfers.  She requires maximum assist for transfers maximum assist for sit to stand at front wheeled walker.  No new concerns or complaints.  No acute distress.    3/24/2023 patient continues working in therapy.  She requires moderate assist for sit to stand transfers.  She is working in speech therapy as well.  Nurse reporting patient with nausea and vomiting after meals.  She was followed by GI in the hospital and treated with IV Protonix and IV Zofran.  Patient is currently on PPI.  She is having bowel movements according to the nurse.    3/27/23 Patient with Dementia continues to work in speech, physical and occupational therapy. She requires moderate assistance for moderate assistance for transfers. Nurse reports that she has had weight loss and dietician will evaluate.  Presently denies nausea or vomiting, fever, chills or diarrhea. In addition her BS was low at 42, she takes metformin once daily for DM. BMP was  obtained and revealed H&H 7.4 and 12.5. No other concerns today.     3/29/2023 patient is working on transfers in therapy and continues to work toward goals.  She has no new concerns today.  Denies constitutional symptoms.    3/30/23 Patient working in therapy due to weakness and debility. Patient requires moderate assistance for transfers. Denies pain at this time. No acute distress or new concerns. Denies SOB or unexplained weight gain.     3/31/2023 patient with no new concerns today.  She continues to work towards goals in therapy.  Requires wheelchair for mobility.  Denies constitutional symptoms.    4/4/23  patient continues to work in therapy due to weakness.  She is working on strengthening and requires a wheelchair for mobility.  No new issues or concerns.  No acute distress.  Denies shortness of breath.    4/5/23  Patient has been working in therapy to improve strength, endurance, and ADLs.  Patient continues to work toward goals.  No new concerns today.  Denies n/v/f/c pain.      4/7/23 Patient has no new complaints today. Compliant with therapies and requires partial to mod assistance with most activies. She continues to work toward goals.      4/10/23 Patient with UR would like to have lorenz catheter removed and attempt voiding trial. Denies ABD discomfort or burning with urination. Patient requires moderate assistance for transfers and ADL's.     4/12/2023 patient has been working with PT and OT.  She requires minimal to moderate assist for sit to stand transfers.  Patient is able to ambulate 12 feet with front wheeled walker with contact-guard to minimal assist.  Nursing staff reports that patient has had poor appetite weight loss and tearfulness at times.  Patient denies signs symptoms and denies feeling down.  Weight has gone down 24 pounds since November however patient does have chronic cholecystitis.  She denies abdominal pain nausea vomiting fever chills.  She is followed by dietitian and on  supplements.    4/14/23 patient in therapy d/t generalized weakness.  Patient presents for f/u.  Continues to work toward goals in therapy.  No new complaints at this time.  States she feels good    4/17/23  nurse reports patient with increased leg pain.  Patient does have order for lateral Aspercreme which is effective when applied.  Therapy continues to work with patient.  Patient is ambulating over 10 feet with a walker and  contact-guard to minimal assistance.  No new issues or concerns.  No acute distress.   Denies shortness of breath orthopnea.    4/19/23  patient continues to participate in therapy.  No new concerns reported by nursing.  Patient was issued a last covered day of 4/21/2023.  Per social work patient's son is planning to appeal.        Objective   Vital signs:  141/90, 97.5, 83, 20, 95%  Physical Exam  Constitutional:       General: She is not in acute distress.  Eyes:      Extraocular Movements: Extraocular movements intact.   Cardiovascular:      Rate and Rhythm: Regular rhythm.   Pulmonary:      Effort: Pulmonary effort is normal.      Breath sounds: Normal breath sounds.   Abdominal:      General: Bowel sounds are normal.      Palpations: Abdomen is soft.   Musculoskeletal:      Cervical back: Neck supple.      Right lower leg: No edema.      Left lower leg: No edema.      Comments: Generalized weakness   Neurological:      Mental Status: She is alert.   Psychiatric:         Mood and Affect: Mood normal.         Behavior: Behavior is cooperative.         Assessment/Plan   Problem List Items Addressed This Visit       Chronic diastolic heart failure (CMS/HCC)     Stable, no sob   Cont Diuretic  Monitor weight         Hypertension, essential     Continue antihypertensives  Continue to monitor blood pressure         Mild late onset Alzheimer's dementia without behavioral disturbance, psychotic disturbance, mood disturbance, or anxiety (CMS/HCC)    Weakness - Primary     Continue with  therapy  Family appealing for extension          Medications, treatments, and labs reviewed  Continue medications and treatments as listed in PCC    Scribe Attestation  IFiorella Scribe   attest that this documentation has been prepared under the direction and in the presence of KAIDEN Banda    Provider Attestation - Scribe documentation  All medical record entries made by the Scribe were at my direction and personally dictated by me. I have reviewed the chart and agree that the record accurately reflects my personal performance of the history, physical exam, discussion and plan.   KAIDEN Banda

## 2023-04-19 NOTE — LETTER
Patient: Gwen Fowler  : 1930    Encounter Date: 2023    PROGRESS NOTE    Subjective  Chief complaint: Gwen Fowler is a 89 y.o. female who is a acute skilled care patient being seen and evaluated for weakness.    HPI:  3/22/23 patient admitted to SNF for therapy d/t weakness after recent hospitalization s/p fall.  During hospitalization patient had nausea vomiting and abdominal pain.  HIDA scan was performed and showed chronic cholecystitis and patient underwent laparoscopic cholecystectomy.  She had some left knee pain and underwent aspiration to rule out septic arthritis and recommendation was made for further work-up as outpatient.  She was given a left lower extremity immobilizer.  She states that she continues to have pain in multiple joints which is not new.  Pain medication is somewhat effective.  Patient has been working in therapy and is working on transfers.  She requires maximum assist for stand pivot transfer and maximum assist for sit to stand at front wheeled walker.    3/23/23 patient continues to work in therapy and is working on safe transfers.  She requires maximum assist for transfers maximum assist for sit to stand at front wheeled walker.  No new concerns or complaints.  No acute distress.    3/24/2023 patient continues working in therapy.  She requires moderate assist for sit to stand transfers.  She is working in speech therapy as well.  Nurse reporting patient with nausea and vomiting after meals.  She was followed by GI in the hospital and treated with IV Protonix and IV Zofran.  Patient is currently on PPI.  She is having bowel movements according to the nurse.    3/27/23 Patient with Dementia continues to work in speech, physical and occupational therapy. She requires moderate assistance for moderate assistance for transfers. Nurse reports that she has had weight loss and dietician will evaluate.  Presently denies nausea or vomiting, fever, chills or diarrhea. In  addition her BS was low at 42, she takes metformin once daily for DM. BMP was obtained and revealed H&H 7.4 and 12.5. No other concerns today.     3/29/2023 patient is working on transfers in therapy and continues to work toward goals.  She has no new concerns today.  Denies constitutional symptoms.    3/30/23 Patient working in therapy due to weakness and debility. Patient requires moderate assistance for transfers. Denies pain at this time. No acute distress or new concerns. Denies SOB or unexplained weight gain.     3/31/2023 patient with no new concerns today.  She continues to work towards goals in therapy.  Requires wheelchair for mobility.  Denies constitutional symptoms.    4/4/23  patient continues to work in therapy due to weakness.  She is working on strengthening and requires a wheelchair for mobility.  No new issues or concerns.  No acute distress.  Denies shortness of breath.    4/5/23  Patient has been working in therapy to improve strength, endurance, and ADLs.  Patient continues to work toward goals.  No new concerns today.  Denies n/v/f/c pain.      4/7/23 Patient has no new complaints today. Compliant with therapies and requires partial to mod assistance with most activies. She continues to work toward goals.      4/10/23 Patient with UR would like to have lorenz catheter removed and attempt voiding trial. Denies ABD discomfort or burning with urination. Patient requires moderate assistance for transfers and ADL's.     4/12/2023 patient has been working with PT and OT.  She requires minimal to moderate assist for sit to stand transfers.  Patient is able to ambulate 12 feet with front wheeled walker with contact-guard to minimal assist.  Nursing staff reports that patient has had poor appetite weight loss and tearfulness at times.  Patient denies signs symptoms and denies feeling down.  Weight has gone down 24 pounds since November however patient does have chronic cholecystitis.  She denies  abdominal pain nausea vomiting fever chills.  She is followed by dietitian and on supplements.    4/14/23 patient in therapy d/t generalized weakness.  Patient presents for f/u.  Continues to work toward goals in therapy.  No new complaints at this time.  States she feels good    4/17/23  nurse reports patient with increased leg pain.  Patient does have order for lateral Aspercreme which is effective when applied.  Therapy continues to work with patient.  Patient is ambulating over 10 feet with a walker and  contact-guard to minimal assistance.  No new issues or concerns.  No acute distress.   Denies shortness of breath orthopnea.    4/19/23  patient continues to participate in therapy.  No new concerns reported by nursing.  Patient was issued a last covered day of 4/21/2023.  Per social work patient's son is planning to appeal.        Objective  Vital signs:  141/90, 97.5, 83, 20, 95%  Physical Exam  Constitutional:       General: She is not in acute distress.  Eyes:      Extraocular Movements: Extraocular movements intact.   Cardiovascular:      Rate and Rhythm: Regular rhythm.   Pulmonary:      Effort: Pulmonary effort is normal.      Breath sounds: Normal breath sounds.   Abdominal:      General: Bowel sounds are normal.      Palpations: Abdomen is soft.   Musculoskeletal:      Cervical back: Neck supple.      Right lower leg: No edema.      Left lower leg: No edema.      Comments: Generalized weakness   Neurological:      Mental Status: She is alert.   Psychiatric:         Mood and Affect: Mood normal.         Behavior: Behavior is cooperative.         Assessment/Plan  Problem List Items Addressed This Visit       Chronic diastolic heart failure (CMS/HCC)     Stable, no sob   Cont Diuretic  Monitor weight         Hypertension, essential     Continue antihypertensives  Continue to monitor blood pressure         Mild late onset Alzheimer's dementia without behavioral disturbance, psychotic disturbance, mood  disturbance, or anxiety (CMS/HCC)    Weakness - Primary     Continue with therapy  Family appealing for extension          Medications, treatments, and labs reviewed  Continue medications and treatments as listed in PCC    Scribe Attestation  IFiorella Scribe   attest that this documentation has been prepared under the direction and in the presence of KAIDEN Banda    Provider Attestation - Scribe documentation  All medical record entries made by the Scribe were at my direction and personally dictated by me. I have reviewed the chart and agree that the record accurately reflects my personal performance of the history, physical exam, discussion and plan.   KAIDEN Banda        Electronically Signed By: KAIDEN Banda   4/23/23 11:12 AM

## 2023-04-19 NOTE — PROGRESS NOTES
PROGRESS NOTE    Subjective   Chief complaint: Gwen Fowler is a 89 y.o. female who is an acute skilled patient being seen and evaluated for weakness    HPI:  3/22/23 patient admitted to SNF for therapy d/t weakness after recent hospitalization s/p fall.  During hospitalization patient had nausea vomiting and abdominal pain.  HIDA scan was performed and showed chronic cholecystitis and patient underwent laparoscopic cholecystectomy.  She had some left knee pain and underwent aspiration to rule out septic arthritis and recommendation was made for further work-up as outpatient.  She was given a left lower extremity immobilizer.  She states that she continues to have pain in multiple joints which is not new.  Pain medication is somewhat effective.  Patient has been working in therapy and is working on transfers.  She requires maximum assist for stand pivot transfer and maximum assist for sit to stand at front wheeled walker.    3/23/23 patient continues to work in therapy and is working on safe transfers.  She requires maximum assist for transfers maximum assist for sit to stand at front wheeled walker.  No new concerns or complaints.  No acute distress.    3/24/2023 patient continues working in therapy.  She requires moderate assist for sit to stand transfers.  She is working in speech therapy as well.  Nurse reporting patient with nausea and vomiting after meals.  She was followed by GI in the hospital and treated with IV Protonix and IV Zofran.  Patient is currently on PPI.  She is having bowel movements according to the nurse.    3/27/23 Patient with Dementia continues to work in speech, physical and occupational therapy. She requires moderate assistance for moderate assistance for transfers. Nurse reports that she has had weight loss and dietician will evaluate.  Presently denies nausea or vomiting, fever, chills or diarrhea. In addition her BS was low at 42, she takes metformin once daily for DM. BMP was  obtained and revealed H&H 7.4 and 12.5. No other concerns today.     3/29/2023 patient is working on transfers in therapy and continues to work toward goals.  She has no new concerns today.  Denies constitutional symptoms.    3/30/23 Patient working in therapy due to weakness and debility. Patient requires moderate assistance for transfers. Denies pain at this time. No acute distress or new concerns. Denies SOB or unexplained weight gain.     3/31/2023 patient with no new concerns today.  She continues to work towards goals in therapy.  Requires wheelchair for mobility.  Denies constitutional symptoms.    4/4/23  patient continues to work in therapy due to weakness.  She is working on strengthening and requires a wheelchair for mobility.  No new issues or concerns.  No acute distress.  Denies shortness of breath.    4/5/23  Patient has been working in therapy to improve strength, endurance, and ADLs.  Patient continues to work toward goals.  No new concerns today.  Denies n/v/f/c pain.      4/7/23 Patient has no new complaints today. Compliant with therapies and requires partial to mod assistance with most activies. She continues to work toward goals.      4/10/23 Patient with UR would like to have lorenz catheter removed and attempt voiding trial. Denies ABD discomfort or burning with urination. Patient requires moderate assistance for transfers and ADL's.     4/12/2023 patient has been working with PT and OT.  She requires minimal to moderate assist for sit to stand transfers.  Patient is able to ambulate 12 feet with front wheeled walker with contact-guard to minimal assist.  Nursing staff reports that patient has had poor appetite weight loss and tearfulness at times.  Patient denies signs symptoms and denies feeling down.  Weight has gone down 24 pounds since November however patient does have chronic cholecystitis.  She denies abdominal pain nausea vomiting fever chills.  She is followed by dietitian and on  supplements.    4/14/23 patient in therapy d/t generalized weakness.  Patient presents for f/u.  Continues to work toward goals in therapy.  No new complaints at this time.  States she feels good    4/17/23  nurse reports patient with increased leg pain.  Patient does have order for lateral Aspercreme which is effective when applied.  Therapy continues to work with patient.  Patient is ambulating over 10 feet with a walker and  contact-guard to minimal assistance.  No new issues or concerns.  No acute distress.   Denies shortness of breath orthopnea.      Objective   Vital signs:  137/72, 96%    Physical Exam  Constitutional:       General: She is not in acute distress.  Eyes:      Extraocular Movements: Extraocular movements intact.   Cardiovascular:      Rate and Rhythm: Normal rate and regular rhythm.   Pulmonary:      Effort: Pulmonary effort is normal.      Breath sounds: Normal breath sounds.   Abdominal:      General: Bowel sounds are normal.      Palpations: Abdomen is soft.   Musculoskeletal:         General: Normal range of motion.      Cervical back: Normal range of motion and neck supple.      Right lower leg: Edema present.      Left lower leg: Edema present.      Comments: BLE +pain   Neurological:      Mental Status: She is alert.   Psychiatric:         Mood and Affect: Mood normal.         Behavior: Behavior is cooperative.         Assessment/Plan   Problem List Items Addressed This Visit          Circulatory    Chronic diastolic heart failure (CMS/HCC)     Stable, no sob   Cont Diuretic  Monitor weight            Other    Weakness     Continue with therapy          Medications, treatments, and labs reviewed  Continue medications and treatments as listed in PCC    Keely Braden MD    1. Chronic diastolic heart failure (CMS/HCC)        2. Weakness             Scribe Attestation  By signing my name below, IIsabel, Scribe   attest that this documentation has been prepared under the direction  and in the presence of Keely Braden MD.    Provider Attestation - Scribe documentation  All medical record entries made by the Scribe were at my direction and personally dictated by me. I have reviewed the chart and agree that the record accurately reflects my personal performance of the history, physical exam, discussion and plan.

## 2023-04-20 ENCOUNTER — NURSING HOME VISIT (OUTPATIENT)
Dept: POST ACUTE CARE | Facility: EXTERNAL LOCATION | Age: 88
End: 2023-04-20
Payer: MEDICARE

## 2023-04-20 DIAGNOSIS — R53.1 WEAKNESS: ICD-10-CM

## 2023-04-20 PROCEDURE — 99308 SBSQ NF CARE LOW MDM 20: CPT | Performed by: INTERNAL MEDICINE

## 2023-04-20 NOTE — PROGRESS NOTES
PROGRESS NOTE    Subjective   Chief complaint: Gwen Fowler is a 89 y.o. female who is an acute skilled patient being seen and evaluated for weakness    HPI:   3/22/23 patient admitted to SNF for therapy d/t weakness after recent hospitalization s/p fall.  During hospitalization patient had nausea vomiting and abdominal pain.  HIDA scan was performed and showed chronic cholecystitis and patient underwent laparoscopic cholecystectomy.  She had some left knee pain and underwent aspiration to rule out septic arthritis and recommendation was made for further work-up as outpatient.  She was given a left lower extremity immobilizer.  She states that she continues to have pain in multiple joints which is not new.  Pain medication is somewhat effective.  Patient has been working in therapy and is working on transfers.  She requires maximum assist for stand pivot transfer and maximum assist for sit to stand at front wheeled walker.    3/23/23 patient continues to work in therapy and is working on safe transfers.  She requires maximum assist for transfers maximum assist for sit to stand at front wheeled walker.  No new concerns or complaints.  No acute distress.    3/24/2023 patient continues working in therapy.  She requires moderate assist for sit to stand transfers.  She is working in speech therapy as well.  Nurse reporting patient with nausea and vomiting after meals.  She was followed by GI in the hospital and treated with IV Protonix and IV Zofran.  Patient is currently on PPI.  She is having bowel movements according to the nurse.    3/27/23 Patient with Dementia continues to work in speech, physical and occupational therapy. She requires moderate assistance for moderate assistance for transfers. Nurse reports that she has had weight loss and dietician will evaluate.  Presently denies nausea or vomiting, fever, chills or diarrhea. In addition her BS was low at 42, she takes metformin once daily for DM. BMP was  obtained and revealed H&H 7.4 and 12.5. No other concerns today.     3/29/2023 patient is working on transfers in therapy and continues to work toward goals.  She has no new concerns today.  Denies constitutional symptoms.    3/30/23 Patient working in therapy due to weakness and debility. Patient requires moderate assistance for transfers. Denies pain at this time. No acute distress or new concerns. Denies SOB or unexplained weight gain.     3/31/2023 patient with no new concerns today.  She continues to work towards goals in therapy.  Requires wheelchair for mobility.  Denies constitutional symptoms.    4/4/23  patient continues to work in therapy due to weakness.  She is working on strengthening and requires a wheelchair for mobility.  No new issues or concerns.  No acute distress.  Denies shortness of breath.    4/5/23  Patient has been working in therapy to improve strength, endurance, and ADLs.  Patient continues to work toward goals.  No new concerns today.  Denies n/v/f/c pain.      4/7/23 Patient has no new complaints today. Compliant with therapies and requires partial to mod assistance with most activies. She continues to work toward goals.      4/10/23 Patient with UR would like to have lorenz catheter removed and attempt voiding trial. Denies ABD discomfort or burning with urination. Patient requires moderate assistance for transfers and ADL's.     4/12/2023 patient has been working with PT and OT.  She requires minimal to moderate assist for sit to stand transfers.  Patient is able to ambulate 12 feet with front wheeled walker with contact-guard to minimal assist.  Nursing staff reports that patient has had poor appetite weight loss and tearfulness at times.  Patient denies signs symptoms and denies feeling down.  Weight has gone down 24 pounds since November however patient does have chronic cholecystitis.  She denies abdominal pain nausea vomiting fever chills.  She is followed by dietitian and on  supplements.    4/14/23 patient in therapy d/t generalized weakness.  Patient presents for f/u.  Continues to work toward goals in therapy.  No new complaints at this time.  States she feels good    4/17/23  nurse reports patient with increased leg pain.  Patient does have order for lateral Aspercreme which is effective when applied.  Therapy continues to work with patient.  Patient is ambulating over 10 feet with a walker and  contact-guard to minimal assistance.  No new issues or concerns.  No acute distress.   Denies shortness of breath orthopnea.     4/18/23 patient has been working with therapy and doing well.  Weakness improving.   Pain controlled on current pain medications.  Denies constitutional symptoms.  Denies shortness of breath, orthopnea or   Increased edema.      Objective   Vital signs:   133/76, 98%    Physical Exam  Constitutional:       General: She is not in acute distress.  Eyes:      Extraocular Movements: Extraocular movements intact.   Cardiovascular:      Rate and Rhythm: Normal rate and regular rhythm.   Pulmonary:      Effort: Pulmonary effort is normal.      Breath sounds: Normal breath sounds.   Abdominal:      General: Bowel sounds are normal.      Palpations: Abdomen is soft.   Musculoskeletal:      Cervical back: Normal range of motion and neck supple.      Right lower leg: No edema.      Left lower leg: No edema.   Neurological:      Mental Status: She is alert.   Psychiatric:         Mood and Affect: Mood normal.         Behavior: Behavior is cooperative.         Assessment/Plan   Problem List Items Addressed This Visit          Circulatory    Chronic diastolic heart failure (CMS/HCC)     Stable, no sob   Cont Diuretic  Monitor weight            Musculoskeletal    Primary osteoarthritis involving multiple joints     Pain is controlled.  Continue PT OT.            Endocrine/Metabolic    Weight loss     Dietitian following  Continue to monitor weight  Continue supplements           Medications, treatments, and labs reviewed  Continue medications and treatments as listed in PCC    Keely Braden MD    1. Chronic diastolic heart failure (CMS/[facility])        2. Weight loss        3. Primary osteoarthritis involving multiple joints             Scribe Attestation  By signing my name below, I, Isabeljermaine Guzmanh, Christinaibcodi   attest that this documentation has been prepared under the direction and in the presence of Keely Braden MD.    Provider Attestation - Scribe documentation  All medical record entries made by the Scribe were at my direction and personally dictated by me. I have reviewed the chart and agree that the record accurately reflects my personal performance of the history, physical exam, discussion and plan.

## 2023-04-20 NOTE — LETTER
Patient: Gwen Fowler  : 1930    Encounter Date: 2023    PROGRESS NOTE    Subjective  Chief complaint: Gwen Fowler is a 89 y.o. female who is a acute skilled care patient being seen and evaluated for weakness.    HPI:  3/22/23 patient admitted to SNF for therapy d/t weakness after recent hospitalization s/p fall.  During hospitalization patient had nausea vomiting and abdominal pain.  HIDA scan was performed and showed chronic cholecystitis and patient underwent laparoscopic cholecystectomy.  She had some left knee pain and underwent aspiration to rule out septic arthritis and recommendation was made for further work-up as outpatient.  She was given a left lower extremity immobilizer.  She states that she continues to have pain in multiple joints which is not new.  Pain medication is somewhat effective.  Patient has been working in therapy and is working on transfers.  She requires maximum assist for stand pivot transfer and maximum assist for sit to stand at front wheeled walker.    3/23/23 patient continues to work in therapy and is working on safe transfers.  She requires maximum assist for transfers maximum assist for sit to stand at front wheeled walker.  No new concerns or complaints.  No acute distress.    3/24/2023 patient continues working in therapy.  She requires moderate assist for sit to stand transfers.  She is working in speech therapy as well.  Nurse reporting patient with nausea and vomiting after meals.  She was followed by GI in the hospital and treated with IV Protonix and IV Zofran.  Patient is currently on PPI.  She is having bowel movements according to the nurse.    3/27/23 Patient with Dementia continues to work in speech, physical and occupational therapy. She requires moderate assistance for moderate assistance for transfers. Nurse reports that she has had weight loss and dietician will evaluate.  Presently denies nausea or vomiting, fever, chills or diarrhea. In  addition her BS was low at 42, she takes metformin once daily for DM. BMP was obtained and revealed H&H 7.4 and 12.5. No other concerns today.     3/29/2023 patient is working on transfers in therapy and continues to work toward goals.  She has no new concerns today.  Denies constitutional symptoms.    3/30/23 Patient working in therapy due to weakness and debility. Patient requires moderate assistance for transfers. Denies pain at this time. No acute distress or new concerns. Denies SOB or unexplained weight gain.     3/31/2023 patient with no new concerns today.  She continues to work towards goals in therapy.  Requires wheelchair for mobility.  Denies constitutional symptoms.    4/4/23  patient continues to work in therapy due to weakness.  She is working on strengthening and requires a wheelchair for mobility.  No new issues or concerns.  No acute distress.  Denies shortness of breath.    4/5/23  Patient has been working in therapy to improve strength, endurance, and ADLs.  Patient continues to work toward goals.  No new concerns today.  Denies n/v/f/c pain.      4/7/23 Patient has no new complaints today. Compliant with therapies and requires partial to mod assistance with most activies. She continues to work toward goals.      4/10/23 Patient with UR would like to have lorenz catheter removed and attempt voiding trial. Denies ABD discomfort or burning with urination. Patient requires moderate assistance for transfers and ADL's.     4/12/2023 patient has been working with PT and OT.  She requires minimal to moderate assist for sit to stand transfers.  Patient is able to ambulate 12 feet with front wheeled walker with contact-guard to minimal assist.  Nursing staff reports that patient has had poor appetite weight loss and tearfulness at times.  Patient denies signs symptoms and denies feeling down.  Weight has gone down 24 pounds since November however patient does have chronic cholecystitis.  She denies  abdominal pain nausea vomiting fever chills.  She is followed by dietitian and on supplements.    4/14/23 patient in therapy d/t generalized weakness.  Patient presents for f/u.  Continues to work toward goals in therapy.  No new complaints at this time.  States she feels good    4/17/23  nurse reports patient with increased leg pain.  Patient does have order for lateral Aspercreme which is effective when applied.  Therapy continues to work with patient.  Patient is ambulating over 10 feet with a walker and  contact-guard to minimal assistance.  No new issues or concerns.  No acute distress.   Denies shortness of breath orthopnea.    4/19/23  atient continues to participate in therapy.  No new concerns reported by nursing.  Patient was issued a last covered day of 4/21/2023.  Per social work patient's son is planning to appeal.      4/20/23 Patient working in therapy LCD today. Family appealed and awaiting response. No acute distress.       Objective  Vital signs:  139/87,  96%  Physical Exam  Constitutional:       General: She is not in acute distress.  Eyes:      Extraocular Movements: Extraocular movements intact.   Cardiovascular:      Rate and Rhythm: Regular rhythm.   Pulmonary:      Effort: Pulmonary effort is normal.      Breath sounds: Normal breath sounds.   Abdominal:      General: Bowel sounds are normal.      Palpations: Abdomen is soft.   Musculoskeletal:      Cervical back: Neck supple.      Right lower leg: No edema.      Left lower leg: No edema.      Comments: Generalized weakness   Neurological:      Mental Status: She is alert.   Psychiatric:         Mood and Affect: Mood normal.         Behavior: Behavior is cooperative.         Assessment/Plan  Problem List Items Addressed This Visit          Other    Weakness     [facility] 4/20/23 for therapy  Appeal in place          Medications, treatments, and labs reviewed  Continue medications and treatments as listed in PCC    Scribe Attestation  IIsabel  Noe Horne   attest that this documentation has been prepared under the direction and in the presence of Keely Braden MD    Provider Attestation - Scribe documentation  All medical record entries made by the Scribe were at my direction and personally dictated by me. I have reviewed the chart and agree that the record accurately reflects my personal performance of the history, physical exam, discussion and plan.   Keely Braden MD        Electronically Signed By: Keely Braden MD   4/23/23 11:10 AM

## 2023-04-21 ENCOUNTER — NURSING HOME VISIT (OUTPATIENT)
Dept: POST ACUTE CARE | Facility: EXTERNAL LOCATION | Age: 88
End: 2023-04-21
Payer: MEDICARE

## 2023-04-21 DIAGNOSIS — R53.1 WEAKNESS: Primary | ICD-10-CM

## 2023-04-21 DIAGNOSIS — I50.32 CHRONIC DIASTOLIC HEART FAILURE (MULTI): ICD-10-CM

## 2023-04-21 DIAGNOSIS — F02.A0 MILD LATE ONSET ALZHEIMER'S DEMENTIA WITHOUT BEHAVIORAL DISTURBANCE, PSYCHOTIC DISTURBANCE, MOOD DISTURBANCE, OR ANXIETY (MULTI): ICD-10-CM

## 2023-04-21 DIAGNOSIS — G30.1 MILD LATE ONSET ALZHEIMER'S DEMENTIA WITHOUT BEHAVIORAL DISTURBANCE, PSYCHOTIC DISTURBANCE, MOOD DISTURBANCE, OR ANXIETY (MULTI): ICD-10-CM

## 2023-04-21 DIAGNOSIS — I10 HYPERTENSION, ESSENTIAL: ICD-10-CM

## 2023-04-21 PROCEDURE — 99309 SBSQ NF CARE MODERATE MDM 30: CPT | Performed by: NURSE PRACTITIONER

## 2023-04-21 NOTE — PROGRESS NOTES
PROGRESS NOTE    Subjective   Chief complaint: Gwen Fowler is a 89 y.o. female who is a acute skilled care patient being seen and evaluated for weakness.    HPI:  3/22/23 patient admitted to SNF for therapy d/t weakness after recent hospitalization s/p fall.  During hospitalization patient had nausea vomiting and abdominal pain.  HIDA scan was performed and showed chronic cholecystitis and patient underwent laparoscopic cholecystectomy.  She had some left knee pain and underwent aspiration to rule out septic arthritis and recommendation was made for further work-up as outpatient.  She was given a left lower extremity immobilizer.  She states that she continues to have pain in multiple joints which is not new.  Pain medication is somewhat effective.  Patient has been working in therapy and is working on transfers.  She requires maximum assist for stand pivot transfer and maximum assist for sit to stand at front wheeled walker.    3/23/23 patient continues to work in therapy and is working on safe transfers.  She requires maximum assist for transfers maximum assist for sit to stand at front wheeled walker.  No new concerns or complaints.  No acute distress.    3/24/2023 patient continues working in therapy.  She requires moderate assist for sit to stand transfers.  She is working in speech therapy as well.  Nurse reporting patient with nausea and vomiting after meals.  She was followed by GI in the hospital and treated with IV Protonix and IV Zofran.  Patient is currently on PPI.  She is having bowel movements according to the nurse.    3/27/23 Patient with Dementia continues to work in speech, physical and occupational therapy. She requires moderate assistance for moderate assistance for transfers. Nurse reports that she has had weight loss and dietician will evaluate.  Presently denies nausea or vomiting, fever, chills or diarrhea. In addition her BS was low at 42, she takes metformin once daily for DM. BMP was  obtained and revealed H&H 7.4 and 12.5. No other concerns today.     3/29/2023 patient is working on transfers in therapy and continues to work toward goals.  She has no new concerns today.  Denies constitutional symptoms.    3/30/23 Patient working in therapy due to weakness and debility. Patient requires moderate assistance for transfers. Denies pain at this time. No acute distress or new concerns. Denies SOB or unexplained weight gain.     3/31/2023 patient with no new concerns today.  She continues to work towards goals in therapy.  Requires wheelchair for mobility.  Denies constitutional symptoms.    4/4/23  patient continues to work in therapy due to weakness.  She is working on strengthening and requires a wheelchair for mobility.  No new issues or concerns.  No acute distress.  Denies shortness of breath.    4/5/23  Patient has been working in therapy to improve strength, endurance, and ADLs.  Patient continues to work toward goals.  No new concerns today.  Denies n/v/f/c pain.      4/7/23 Patient has no new complaints today. Compliant with therapies and requires partial to mod assistance with most activies. She continues to work toward goals.      4/10/23 Patient with UR would like to have lorenz catheter removed and attempt voiding trial. Denies ABD discomfort or burning with urination. Patient requires moderate assistance for transfers and ADL's.     4/12/2023 patient has been working with PT and OT.  She requires minimal to moderate assist for sit to stand transfers.  Patient is able to ambulate 12 feet with front wheeled walker with contact-guard to minimal assist.  Nursing staff reports that patient has had poor appetite weight loss and tearfulness at times.  Patient denies signs symptoms and denies feeling down.  Weight has gone down 24 pounds since November however patient does have chronic cholecystitis.  She denies abdominal pain nausea vomiting fever chills.  She is followed by dietitian and on  supplements.    4/14/23 patient in therapy d/t generalized weakness.  Patient presents for f/u.  Continues to work toward goals in therapy.  No new complaints at this time.  States she feels good    4/17/23  nurse reports patient with increased leg pain.  Patient does have order for lateral Aspercreme which is effective when applied.  Therapy continues to work with patient.  Patient is ambulating over 10 feet with a walker and  contact-guard to minimal assistance.  No new issues or concerns.  No acute distress.   Denies shortness of breath orthopnea.    4/19/23  atient continues to participate in therapy.  No new concerns reported by nursing.  Patient was issued a last covered day of 4/21/2023.  Per social work patient's son is planning to appeal.      4/20/23 Patient working in therapy LCD today. Family appealed and awaiting response. No acute distress.       Objective   Vital signs:  139/87,  96%  Physical Exam  Constitutional:       General: She is not in acute distress.  Eyes:      Extraocular Movements: Extraocular movements intact.   Cardiovascular:      Rate and Rhythm: Regular rhythm.   Pulmonary:      Effort: Pulmonary effort is normal.      Breath sounds: Normal breath sounds.   Abdominal:      General: Bowel sounds are normal.      Palpations: Abdomen is soft.   Musculoskeletal:      Cervical back: Neck supple.      Right lower leg: No edema.      Left lower leg: No edema.      Comments: Generalized weakness   Neurological:      Mental Status: She is alert.   Psychiatric:         Mood and Affect: Mood normal.         Behavior: Behavior is cooperative.         Assessment/Plan   Problem List Items Addressed This Visit          Other    Weakness     D 4/20/23 for therapy  Appeal in place          Medications, treatments, and labs reviewed  Continue medications and treatments as listed in [facility]    Scribe Attestation  I, Christina Zamoraibcodi   attest that this documentation has been prepared under the  direction and in the presence of Keely Braden MD    Provider Attestation - Scribe documentation  All medical record entries made by the Scribe were at my direction and personally dictated by me. I have reviewed the chart and agree that the record accurately reflects my personal performance of the history, physical exam, discussion and plan.   Keely Braden MD

## 2023-04-21 NOTE — PROGRESS NOTES
PROGRESS NOTE    Subjective   Chief complaint: Gwen Fowler is a 89 y.o. female who is an acute skilled patient being seen and evaluated for weakness    HPI:  3/22/23 patient admitted to SNF for therapy d/t weakness after recent hospitalization s/p fall.  During hospitalization patient had nausea vomiting and abdominal pain.  HIDA scan was performed and showed chronic cholecystitis and patient underwent laparoscopic cholecystectomy.  She had some left knee pain and underwent aspiration to rule out septic arthritis and recommendation was made for further work-up as outpatient.  She was given a left lower extremity immobilizer.  She states that she continues to have pain in multiple joints which is not new.  Pain medication is somewhat effective.  Patient has been working in therapy and is working on transfers.  She requires maximum assist for stand pivot transfer and maximum assist for sit to stand at front wheeled walker.    3/23/23 patient continues to work in therapy and is working on safe transfers.  She requires maximum assist for transfers maximum assist for sit to stand at front wheeled walker.  No new concerns or complaints.  No acute distress.    3/24/2023 patient continues working in therapy.  She requires moderate assist for sit to stand transfers.  She is working in speech therapy as well.  Nurse reporting patient with nausea and vomiting after meals.  She was followed by GI in the hospital and treated with IV Protonix and IV Zofran.  Patient is currently on PPI.  She is having bowel movements according to the nurse.    3/27/23 Patient with Dementia continues to work in speech, physical and occupational therapy. She requires moderate assistance for moderate assistance for transfers. Nurse reports that she has had weight loss and dietician will evaluate.  Presently denies nausea or vomiting, fever, chills or diarrhea. In addition her BS was low at 42, she takes metformin once daily for DM. BMP was  obtained and revealed H&H 7.4 and 12.5. No other concerns today.     3/29/2023 patient is working on transfers in therapy and continues to work toward goals.  She has no new concerns today.  Denies constitutional symptoms.    3/30/23 Patient working in therapy due to weakness and debility. Patient requires moderate assistance for transfers. Denies pain at this time. No acute distress or new concerns. Denies SOB or unexplained weight gain.     3/31/2023 patient with no new concerns today.  She continues to work towards goals in therapy.  Requires wheelchair for mobility.  Denies constitutional symptoms.    4/4/23  patient continues to work in therapy due to weakness.  She is working on strengthening and requires a wheelchair for mobility.  No new issues or concerns.  No acute distress.  Denies shortness of breath.    4/5/23  Patient has been working in therapy to improve strength, endurance, and ADLs.  Patient continues to work toward goals.  No new concerns today.  Denies n/v/f/c pain.      4/7/23 Patient has no new complaints today. Compliant with therapies and requires partial to mod assistance with most activies. She continues to work toward goals.      4/10/23 Patient with UR would like to have lorenz catheter removed and attempt voiding trial. Denies ABD discomfort or burning with urination. Patient requires moderate assistance for transfers and ADL's.     4/12/2023 patient has been working with PT and OT.  She requires minimal to moderate assist for sit to stand transfers.  Patient is able to ambulate 12 feet with front wheeled walker with contact-guard to minimal assist.  Nursing staff reports that patient has had poor appetite weight loss and tearfulness at times.  Patient denies signs symptoms and denies feeling down.  Weight has gone down 24 pounds since November however patient does have chronic cholecystitis.  She denies abdominal pain nausea vomiting fever chills.  She is followed by dietitian and on  supplements.    4/14/23 patient in therapy d/t generalized weakness.  Patient presents for f/u.  Continues to work toward goals in therapy.  No new complaints at this time.  States she feels good    4/17/23  nurse reports patient with increased leg pain.  Patient does have order for lateral Aspercreme which is effective when applied.  Therapy continues to work with patient.  Patient is ambulating over 10 feet with a walker and  contact-guard to minimal assistance.  No new issues or concerns.  No acute distress.   Denies shortness of breath orthopnea.    4/19/23  atient continues to participate in therapy.  No new concerns reported by nursing.  Patient was issued a last covered day of 4/21/2023.  Per social work patient's son is planning to appeal.      4/20/23 Patient working in therapy LCD today. Family appealed and awaiting response. No acute distress.     4/21/2023 Patient continues to work with therapy and is working on bed mobility balance and transfers.  Skilled interventions focused on initiation cues to facilitate skill performance and strengthening activities.  Patient has no new concerns today.  She states she is feeling well.  Denies constitutional symptoms.      Objective   Vital signs: 152/71, 96.4, 91, 20, 97%    Physical Exam  Constitutional:       General: She is not in acute distress.  Eyes:      Extraocular Movements: Extraocular movements intact.   Cardiovascular:      Rate and Rhythm: Regular rhythm.   Pulmonary:      Effort: Pulmonary effort is normal.      Breath sounds: Normal breath sounds.   Abdominal:      General: Bowel sounds are normal.      Palpations: Abdomen is soft.   Musculoskeletal:      Cervical back: Neck supple.      Right lower leg: No edema.      Left lower leg: No edema.      Comments: Generalized weakness   Neurological:      Mental Status: She is alert.   Psychiatric:         Mood and Affect: Mood normal.         Behavior: Behavior is cooperative.         Assessment/Plan    Problem List Items Addressed This Visit       Chronic diastolic heart failure (CMS/HCC)     Stable, no sob   Cont Diuretic  Monitor weight         Hypertension, essential     Continue antihypertensives  Continue to monitor blood pressure         Mild late onset Alzheimer's dementia without behavioral disturbance, psychotic disturbance, mood disturbance, or anxiety (CMS/HCC)    Weakness - Primary     Continue with therapy          Medications, treatments, and labs reviewed  Continue medications and treatments as listed in PCC    Tami Ga, APRN-CNP

## 2023-04-21 NOTE — LETTER
Patient: Gwen Fowler  : 1930    Encounter Date: 2023    PROGRESS NOTE    Subjective  Chief complaint: Gwen Fowler is a 89 y.o. female who is an acute skilled patient being seen and evaluated for weakness    HPI:  3/22/23 patient admitted to SNF for therapy d/t weakness after recent hospitalization s/p fall.  During hospitalization patient had nausea vomiting and abdominal pain.  HIDA scan was performed and showed chronic cholecystitis and patient underwent laparoscopic cholecystectomy.  She had some left knee pain and underwent aspiration to rule out septic arthritis and recommendation was made for further work-up as outpatient.  She was given a left lower extremity immobilizer.  She states that she continues to have pain in multiple joints which is not new.  Pain medication is somewhat effective.  Patient has been working in therapy and is working on transfers.  She requires maximum assist for stand pivot transfer and maximum assist for sit to stand at front wheeled walker.    3/23/23 patient continues to work in therapy and is working on safe transfers.  She requires maximum assist for transfers maximum assist for sit to stand at front wheeled walker.  No new concerns or complaints.  No acute distress.    3/24/2023 patient continues working in therapy.  She requires moderate assist for sit to stand transfers.  She is working in speech therapy as well.  Nurse reporting patient with nausea and vomiting after meals.  She was followed by GI in the hospital and treated with IV Protonix and IV Zofran.  Patient is currently on PPI.  She is having bowel movements according to the nurse.    3/27/23 Patient with Dementia continues to work in speech, physical and occupational therapy. She requires moderate assistance for moderate assistance for transfers. Nurse reports that she has had weight loss and dietician will evaluate.  Presently denies nausea or vomiting, fever, chills or diarrhea. In addition  her BS was low at 42, she takes metformin once daily for DM. BMP was obtained and revealed H&H 7.4 and 12.5. No other concerns today.     3/29/2023 patient is working on transfers in therapy and continues to work toward goals.  She has no new concerns today.  Denies constitutional symptoms.    3/30/23 Patient working in therapy due to weakness and debility. Patient requires moderate assistance for transfers. Denies pain at this time. No acute distress or new concerns. Denies SOB or unexplained weight gain.     3/31/2023 patient with no new concerns today.  She continues to work towards goals in therapy.  Requires wheelchair for mobility.  Denies constitutional symptoms.    4/4/23  patient continues to work in therapy due to weakness.  She is working on strengthening and requires a wheelchair for mobility.  No new issues or concerns.  No acute distress.  Denies shortness of breath.    4/5/23  Patient has been working in therapy to improve strength, endurance, and ADLs.  Patient continues to work toward goals.  No new concerns today.  Denies n/v/f/c pain.      4/7/23 Patient has no new complaints today. Compliant with therapies and requires partial to mod assistance with most activies. She continues to work toward goals.      4/10/23 Patient with UR would like to have lorenz catheter removed and attempt voiding trial. Denies ABD discomfort or burning with urination. Patient requires moderate assistance for transfers and ADL's.     4/12/2023 patient has been working with PT and OT.  She requires minimal to moderate assist for sit to stand transfers.  Patient is able to ambulate 12 feet with front wheeled walker with contact-guard to minimal assist.  Nursing staff reports that patient has had poor appetite weight loss and tearfulness at times.  Patient denies signs symptoms and denies feeling down.  Weight has gone down 24 pounds since November however patient does have chronic cholecystitis.  She denies abdominal pain  nausea vomiting fever chills.  She is followed by dietitian and on supplements.    4/14/23 patient in therapy d/t generalized weakness.  Patient presents for f/u.  Continues to work toward goals in therapy.  No new complaints at this time.  States she feels good    4/17/23  nurse reports patient with increased leg pain.  Patient does have order for lateral Aspercreme which is effective when applied.  Therapy continues to work with patient.  Patient is ambulating over 10 feet with a walker and  contact-guard to minimal assistance.  No new issues or concerns.  No acute distress.   Denies shortness of breath orthopnea.    4/19/23  atient continues to participate in therapy.  No new concerns reported by nursing.  Patient was issued a last covered day of 4/21/2023.  Per social work patient's son is planning to appeal.      4/20/23 Patient working in therapy LCD today. Family appealed and awaiting response. No acute distress.     4/21/2023 Patient continues to work with therapy and is working on bed mobility balance and transfers.  Skilled interventions focused on initiation cues to facilitate skill performance and strengthening activities.  Patient has no new concerns today.  She states she is feeling well.  Denies constitutional symptoms.      Objective  Vital signs: 152/71, 96.4, 91, 20, 97%    Physical Exam  Constitutional:       General: She is not in acute distress.  Eyes:      Extraocular Movements: Extraocular movements intact.   Cardiovascular:      Rate and Rhythm: Regular rhythm.   Pulmonary:      Effort: Pulmonary effort is normal.      Breath sounds: Normal breath sounds.   Abdominal:      General: Bowel sounds are normal.      Palpations: Abdomen is soft.   Musculoskeletal:      Cervical back: Neck supple.      Right lower leg: No edema.      Left lower leg: No edema.      Comments: Generalized weakness   Neurological:      Mental Status: She is alert.   Psychiatric:         Mood and Affect: Mood normal.          Behavior: Behavior is cooperative.         Assessment/Plan  Problem List Items Addressed This Visit       Chronic diastolic heart failure (CMS/HCC)     Stable, no sob   Cont Diuretic  Monitor weight         Hypertension, essential     Continue antihypertensives  Continue to monitor blood pressure         Mild late onset Alzheimer's dementia without behavioral disturbance, psychotic disturbance, mood disturbance, or anxiety (CMS/HCC)    Weakness - Primary     Continue with therapy          Medications, treatments, and labs reviewed  Continue medications and treatments as listed in [facility]    KAIDEN Banda      Electronically Signed By: KAIDEN Banda   4/21/23  3:20 PM

## 2023-04-24 ENCOUNTER — NURSING HOME VISIT (OUTPATIENT)
Dept: POST ACUTE CARE | Facility: EXTERNAL LOCATION | Age: 88
End: 2023-04-24
Payer: MEDICARE

## 2023-04-24 DIAGNOSIS — R53.1 WEAKNESS: ICD-10-CM

## 2023-04-24 DIAGNOSIS — I50.32 CHRONIC DIASTOLIC HEART FAILURE (MULTI): ICD-10-CM

## 2023-04-24 PROCEDURE — 99308 SBSQ NF CARE LOW MDM 20: CPT | Performed by: INTERNAL MEDICINE

## 2023-04-24 NOTE — LETTER
Patient: Gwen Fowler  : 1930    Encounter Date: 2023    PROGRESS NOTE    Subjective  Chief complaint: wGen Fowler is a 89 y.o. female who is an acute skilled patient being seen and evaluated for weakness    HPI:  3/22/23 patient admitted to SNF for therapy d/t weakness after recent hospitalization s/p fall.  During hospitalization patient had nausea vomiting and abdominal pain.  HIDA scan was performed and showed chronic cholecystitis and patient underwent laparoscopic cholecystectomy.  She had some left knee pain and underwent aspiration to rule out septic arthritis and recommendation was made for further work-up as outpatient.  She was given a left lower extremity immobilizer.  She states that she continues to have pain in multiple joints which is not new.  Pain medication is somewhat effective.  Patient has been working in therapy and is working on transfers.  She requires maximum assist for stand pivot transfer and maximum assist for sit to stand at front wheeled walker.    3/23/23 patient continues to work in therapy and is working on safe transfers.  She requires maximum assist for transfers maximum assist for sit to stand at front wheeled walker.  No new concerns or complaints.  No acute distress.    3/24/2023 patient continues working in therapy.  She requires moderate assist for sit to stand transfers.  She is working in speech therapy as well.  Nurse reporting patient with nausea and vomiting after meals.  She was followed by GI in the hospital and treated with IV Protonix and IV Zofran.  Patient is currently on PPI.  She is having bowel movements according to the nurse.    3/27/23 Patient with Dementia continues to work in speech, physical and occupational therapy. She requires moderate assistance for moderate assistance for transfers. Nurse reports that she has had weight loss and dietician will evaluate.  Presently denies nausea or vomiting, fever, chills or diarrhea. In addition  her BS was low at 42, she takes metformin once daily for DM. BMP was obtained and revealed H&H 7.4 and 12.5. No other concerns today.     3/29/2023 patient is working on transfers in therapy and continues to work toward goals.  She has no new concerns today.  Denies constitutional symptoms.    3/30/23 Patient working in therapy due to weakness and debility. Patient requires moderate assistance for transfers. Denies pain at this time. No acute distress or new concerns. Denies SOB or unexplained weight gain.     3/31/2023 patient with no new concerns today.  She continues to work towards goals in therapy.  Requires wheelchair for mobility.  Denies constitutional symptoms.    4/4/23  patient continues to work in therapy due to weakness.  She is working on strengthening and requires a wheelchair for mobility.  No new issues or concerns.  No acute distress.  Denies shortness of breath.    4/5/23  Patient has been working in therapy to improve strength, endurance, and ADLs.  Patient continues to work toward goals.  No new concerns today.  Denies n/v/f/c pain.      4/7/23 Patient has no new complaints today. Compliant with therapies and requires partial to mod assistance with most activies. She continues to work toward goals.      4/10/23 Patient with UR would like to have lorenz catheter removed and attempt voiding trial. Denies ABD discomfort or burning with urination. Patient requires moderate assistance for transfers and ADL's.     4/12/2023 patient has been working with PT and OT.  She requires minimal to moderate assist for sit to stand transfers.  Patient is able to ambulate 12 feet with front wheeled walker with contact-guard to minimal assist.  Nursing staff reports that patient has had poor appetite weight loss and tearfulness at times.  Patient denies signs symptoms and denies feeling down.  Weight has gone down 24 pounds since November however patient does have chronic cholecystitis.  She denies abdominal pain  nausea vomiting fever chills.  She is followed by dietitian and on supplements.    4/14/23 patient in therapy d/t generalized weakness.  Patient presents for f/u.  Continues to work toward goals in therapy.  No new complaints at this time.  States she feels good    4/17/23  nurse reports patient with increased leg pain.  Patient does have order for lateral Aspercreme which is effective when applied.  Therapy continues to work with patient.  Patient is ambulating over 10 feet with a walker and  contact-guard to minimal assistance.  No new issues or concerns.  No acute distress.   Denies shortness of breath orthopnea.    4/19/23  atient continues to participate in therapy.  No new concerns reported by nursing.  Patient was issued a last covered day of 4/21/2023.  Per social work patient's son is planning to appeal.      4/20/23 Patient working in therapy LCD today. Family appealed and awaiting response. No acute distress.     4/21/2023 Patient continues to work with therapy and is working on bed mobility balance and transfers.  Skilled interventions focused on initiation cues to facilitate skill performance and strengthening activities.  Patient has no new concerns today.  She states she is feeling well.  Denies constitutional symptoms.    4/24/23 Patient working in therapy due to weakness. Patient requires assistance for transfers, ADL's and mobility. No new issues at this time. No acute distress. Denies SOB or orthopnea.      Objective  Vital signs: 137/69, 98%    Physical Exam  Constitutional:       General: She is not in acute distress.  Eyes:      Extraocular Movements: Extraocular movements intact.   Cardiovascular:      Rate and Rhythm: Regular rhythm.   Pulmonary:      Effort: Pulmonary effort is normal.      Breath sounds: Normal breath sounds.   Abdominal:      General: Bowel sounds are normal.      Palpations: Abdomen is soft.   Musculoskeletal:      Cervical back: Neck supple.      Right lower leg: No  edema.      Left lower leg: No edema.      Comments: Generalized weakness   Neurological:      Mental Status: She is alert.   Psychiatric:         Mood and Affect: Mood normal.         Behavior: Behavior is cooperative.         Assessment/Plan  Problem List Items Addressed This Visit          Circulatory    Chronic diastolic heart failure (CMS/HCC)     Stable, no sob   Cont Diuretic  Monitor weight            Other    Weakness     therapy          Medications, treatments, and labs reviewed  Continue medications and treatments as listed in [facility]    Keely Braden MD  1. Chronic diastolic heart failure (CMS/HCC)        2. Weakness         Scribe Attestation  IIsabel Scribe   attest that this documentation has been prepared under the direction and in the presence of Keely Braden MD    Provider Attestation - Scribe documentation  All medical record entries made by the Scribe were at my direction and personally dictated by me. I have reviewed the chart and agree that the record accurately reflects my personal performance of the history, physical exam, discussion and plan.   Keely Braden MD      Electronically Signed By: Keely Braden MD   4/25/23  7:16 PM

## 2023-04-25 ENCOUNTER — NURSING HOME VISIT (OUTPATIENT)
Dept: POST ACUTE CARE | Facility: EXTERNAL LOCATION | Age: 88
End: 2023-04-25
Payer: MEDICARE

## 2023-04-25 DIAGNOSIS — I50.32 CHRONIC DIASTOLIC HEART FAILURE (MULTI): ICD-10-CM

## 2023-04-25 DIAGNOSIS — R53.1 WEAKNESS: ICD-10-CM

## 2023-04-25 DIAGNOSIS — R63.0 POOR APPETITE: ICD-10-CM

## 2023-04-25 PROCEDURE — 99309 SBSQ NF CARE MODERATE MDM 30: CPT | Performed by: INTERNAL MEDICINE

## 2023-04-25 NOTE — LETTER
Patient: Gwen Fowler  : 1930    Encounter Date: 2023    PROGRESS NOTE    Subjective  Chief complaint: Gwen Fowler is a 89 y.o. female who is an acute skilled patient being seen and evaluated for weakness    HPI:  3/22/23 patient admitted to SNF for therapy d/t weakness after recent hospitalization s/p fall.  During hospitalization patient had nausea vomiting and abdominal pain.  HIDA scan was performed and showed chronic cholecystitis and patient underwent laparoscopic cholecystectomy.  She had some left knee pain and underwent aspiration to rule out septic arthritis and recommendation was made for further work-up as outpatient.  She was given a left lower extremity immobilizer.  She states that she continues to have pain in multiple joints which is not new.  Pain medication is somewhat effective.  Patient has been working in therapy and is working on transfers.  She requires maximum assist for stand pivot transfer and maximum assist for sit to stand at front wheeled walker.    3/23/23 patient continues to work in therapy and is working on safe transfers.  She requires maximum assist for transfers maximum assist for sit to stand at front wheeled walker.  No new concerns or complaints.  No acute distress.    3/24/2023 patient continues working in therapy.  She requires moderate assist for sit to stand transfers.  She is working in speech therapy as well.  Nurse reporting patient with nausea and vomiting after meals.  She was followed by GI in the hospital and treated with IV Protonix and IV Zofran.  Patient is currently on PPI.  She is having bowel movements according to the nurse.    3/27/23 Patient with Dementia continues to work in speech, physical and occupational therapy. She requires moderate assistance for moderate assistance for transfers. Nurse reports that she has had weight loss and dietician will evaluate.  Presently denies nausea or vomiting, fever, chills or diarrhea. In addition  her BS was low at 42, she takes metformin once daily for DM. BMP was obtained and revealed H&H 7.4 and 12.5. No other concerns today.     3/29/2023 patient is working on transfers in therapy and continues to work toward goals.  She has no new concerns today.  Denies constitutional symptoms.    3/30/23 Patient working in therapy due to weakness and debility. Patient requires moderate assistance for transfers. Denies pain at this time. No acute distress or new concerns. Denies SOB or unexplained weight gain.     3/31/2023 patient with no new concerns today.  She continues to work towards goals in therapy.  Requires wheelchair for mobility.  Denies constitutional symptoms.    4/4/23  patient continues to work in therapy due to weakness.  She is working on strengthening and requires a wheelchair for mobility.  No new issues or concerns.  No acute distress.  Denies shortness of breath.    4/5/23  Patient has been working in therapy to improve strength, endurance, and ADLs.  Patient continues to work toward goals.  No new concerns today.  Denies n/v/f/c pain.      4/7/23 Patient has no new complaints today. Compliant with therapies and requires partial to mod assistance with most activies. She continues to work toward goals.      4/10/23 Patient with UR would like to have lorenz catheter removed and attempt voiding trial. Denies ABD discomfort or burning with urination. Patient requires moderate assistance for transfers and ADL's.     4/12/2023 patient has been working with PT and OT.  She requires minimal to moderate assist for sit to stand transfers.  Patient is able to ambulate 12 feet with front wheeled walker with contact-guard to minimal assist.  Nursing staff reports that patient has had poor appetite weight loss and tearfulness at times.  Patient denies signs symptoms and denies feeling down.  Weight has gone down 24 pounds since November however patient does have chronic cholecystitis.  She denies abdominal pain  nausea vomiting fever chills.  She is followed by dietitian and on supplements.    4/14/23 patient in therapy d/t generalized weakness.  Patient presents for f/u.  Continues to work toward goals in therapy.  No new complaints at this time.  States she feels good    4/17/23  nurse reports patient with increased leg pain.  Patient does have order for lateral Aspercreme which is effective when applied.  Therapy continues to work with patient.  Patient is ambulating over 10 feet with a walker and  contact-guard to minimal assistance.  No new issues or concerns.  No acute distress.   Denies shortness of breath orthopnea.    4/19/23  atient continues to participate in therapy.  No new concerns reported by nursing.  Patient was issued a last covered day of 4/21/2023.  Per social work patient's son is planning to appeal.      4/20/23 Patient working in therapy LCD today. Family appealed and awaiting response. No acute distress.     4/21/2023 Patient continues to work with therapy and is working on bed mobility balance and transfers.  Skilled interventions focused on initiation cues to facilitate skill performance and strengthening activities.  Patient has no new concerns today.  She states she is feeling well.  Denies constitutional symptoms.    4/24/23 Patient working in therapy due to weakness. Patient requires assistance for transfers, ADL's and mobility. No new issues at this time. No acute distress. Denies SOB or orthopnea.    4/25/23  patient continues to work in therapy.  Patient requires contact-guard assist for transfers and ADLs.  Nurse reports patient who has had a recent weight loss has continued poor appetite.  She denies constitutional symptoms.  Denies SOB or orthopnea. No other concerns at this time.  No acute distress.      Objective  Vital signs: 123/76, 97%    Physical Exam  Constitutional:       General: She is not in acute distress.  Eyes:      Extraocular Movements: Extraocular movements intact.    Cardiovascular:      Rate and Rhythm: Normal rate and regular rhythm.   Pulmonary:      Effort: Pulmonary effort is normal.      Breath sounds: Normal breath sounds.   Abdominal:      General: Bowel sounds are normal.      Palpations: Abdomen is soft.   Musculoskeletal:      Cervical back: Neck supple.      Right lower leg: No edema.      Left lower leg: No edema.   Neurological:      Mental Status: She is alert.   Psychiatric:         Mood and Affect: Mood normal.         Behavior: Behavior is cooperative.         Assessment/Plan  Problem List Items Addressed This Visit          Circulatory    Chronic diastolic heart failure (CMS/HCC)     Stable, no sob   Cont Diuretic  Monitor weight            Other    Weakness     therapy           Poor appetite      start Remeron  Monitor intake and weight          Medications, treatments, and labs reviewed  Continue medications and treatments as listed in [facility]    Keely Braden MD    1. Poor appetite        2. Weakness        3. Chronic diastolic heart failure (CMS/HCC)             Scribe Attestation  By signing my name below, IIsabel, Christinaibcodi   attest that this documentation has been prepared under the direction and in the presence of Keely Braden MD.    Provider Attestation - Scribe documentation  All medical record entries made by the Scribe were at my direction and personally dictated by me. I have reviewed the chart and agree that the record accurately reflects my personal performance of the history, physical exam, discussion and plan.      Electronically Signed By: Keely Braden MD   4/26/23  5:48 PM

## 2023-04-25 NOTE — PROGRESS NOTES
PROGRESS NOTE    Subjective   Chief complaint: Gwen Fowler is a 89 y.o. female who is an acute skilled patient being seen and evaluated for weakness    HPI:  3/22/23 patient admitted to SNF for therapy d/t weakness after recent hospitalization s/p fall.  During hospitalization patient had nausea vomiting and abdominal pain.  HIDA scan was performed and showed chronic cholecystitis and patient underwent laparoscopic cholecystectomy.  She had some left knee pain and underwent aspiration to rule out septic arthritis and recommendation was made for further work-up as outpatient.  She was given a left lower extremity immobilizer.  She states that she continues to have pain in multiple joints which is not new.  Pain medication is somewhat effective.  Patient has been working in therapy and is working on transfers.  She requires maximum assist for stand pivot transfer and maximum assist for sit to stand at front wheeled walker.    3/23/23 patient continues to work in therapy and is working on safe transfers.  She requires maximum assist for transfers maximum assist for sit to stand at front wheeled walker.  No new concerns or complaints.  No acute distress.    3/24/2023 patient continues working in therapy.  She requires moderate assist for sit to stand transfers.  She is working in speech therapy as well.  Nurse reporting patient with nausea and vomiting after meals.  She was followed by GI in the hospital and treated with IV Protonix and IV Zofran.  Patient is currently on PPI.  She is having bowel movements according to the nurse.    3/27/23 Patient with Dementia continues to work in speech, physical and occupational therapy. She requires moderate assistance for moderate assistance for transfers. Nurse reports that she has had weight loss and dietician will evaluate.  Presently denies nausea or vomiting, fever, chills or diarrhea. In addition her BS was low at 42, she takes metformin once daily for DM. BMP was  obtained and revealed H&H 7.4 and 12.5. No other concerns today.     3/29/2023 patient is working on transfers in therapy and continues to work toward goals.  She has no new concerns today.  Denies constitutional symptoms.    3/30/23 Patient working in therapy due to weakness and debility. Patient requires moderate assistance for transfers. Denies pain at this time. No acute distress or new concerns. Denies SOB or unexplained weight gain.     3/31/2023 patient with no new concerns today.  She continues to work towards goals in therapy.  Requires wheelchair for mobility.  Denies constitutional symptoms.    4/4/23  patient continues to work in therapy due to weakness.  She is working on strengthening and requires a wheelchair for mobility.  No new issues or concerns.  No acute distress.  Denies shortness of breath.    4/5/23  Patient has been working in therapy to improve strength, endurance, and ADLs.  Patient continues to work toward goals.  No new concerns today.  Denies n/v/f/c pain.      4/7/23 Patient has no new complaints today. Compliant with therapies and requires partial to mod assistance with most activies. She continues to work toward goals.      4/10/23 Patient with UR would like to have lorenz catheter removed and attempt voiding trial. Denies ABD discomfort or burning with urination. Patient requires moderate assistance for transfers and ADL's.     4/12/2023 patient has been working with PT and OT.  She requires minimal to moderate assist for sit to stand transfers.  Patient is able to ambulate 12 feet with front wheeled walker with contact-guard to minimal assist.  Nursing staff reports that patient has had poor appetite weight loss and tearfulness at times.  Patient denies signs symptoms and denies feeling down.  Weight has gone down 24 pounds since November however patient does have chronic cholecystitis.  She denies abdominal pain nausea vomiting fever chills.  She is followed by dietitian and on  supplements.    4/14/23 patient in therapy d/t generalized weakness.  Patient presents for f/u.  Continues to work toward goals in therapy.  No new complaints at this time.  States she feels good    4/17/23  nurse reports patient with increased leg pain.  Patient does have order for lateral Aspercreme which is effective when applied.  Therapy continues to work with patient.  Patient is ambulating over 10 feet with a walker and  contact-guard to minimal assistance.  No new issues or concerns.  No acute distress.   Denies shortness of breath orthopnea.    4/19/23  atient continues to participate in therapy.  No new concerns reported by nursing.  Patient was issued a last covered day of 4/21/2023.  Per social work patient's son is planning to appeal.      4/20/23 Patient working in therapy LCD today. Family appealed and awaiting response. No acute distress.     4/21/2023 Patient continues to work with therapy and is working on bed mobility balance and transfers.  Skilled interventions focused on initiation cues to facilitate skill performance and strengthening activities.  Patient has no new concerns today.  She states she is feeling well.  Denies constitutional symptoms.    4/24/23 Patient working in therapy due to weakness. Patient requires assistance for transfers, ADL's and mobility. No new issues at this time. No acute distress. Denies SOB or orthopnea.      Objective   Vital signs: 137/69, 98%    Physical Exam  Constitutional:       General: She is not in acute distress.  Eyes:      Extraocular Movements: Extraocular movements intact.   Cardiovascular:      Rate and Rhythm: Regular rhythm.   Pulmonary:      Effort: Pulmonary effort is normal.      Breath sounds: Normal breath sounds.   Abdominal:      General: Bowel sounds are normal.      Palpations: Abdomen is soft.   Musculoskeletal:      Cervical back: Neck supple.      Right lower leg: No edema.      Left lower leg: No edema.      Comments: Generalized  weakness   Neurological:      Mental Status: She is alert.   Psychiatric:         Mood and Affect: Mood normal.         Behavior: Behavior is cooperative.         Assessment/Plan   Problem List Items Addressed This Visit          Circulatory    Chronic diastolic heart failure (CMS/HCC)     Stable, no sob   Cont Diuretic  Monitor weight            Other    Weakness     therapy          Medications, treatments, and labs reviewed  Continue medications and treatments as listed in [facility]    Keely Braden MD  1. Chronic diastolic heart failure (CMS/HCC)        2. Weakness         Scribe Attestation  IIsabel Scribe   attest that this documentation has been prepared under the direction and in the presence of Keely Braden MD    Provider Attestation - Scribe documentation  All medical record entries made by the Scribe were at my direction and personally dictated by me. I have reviewed the chart and agree that the record accurately reflects my personal performance of the history, physical exam, discussion and plan.   Keely Braden MD

## 2023-04-26 ENCOUNTER — NURSING HOME VISIT (OUTPATIENT)
Dept: POST ACUTE CARE | Facility: EXTERNAL LOCATION | Age: 88
End: 2023-04-26
Payer: MEDICARE

## 2023-04-26 DIAGNOSIS — I50.32 CHRONIC DIASTOLIC HEART FAILURE (MULTI): ICD-10-CM

## 2023-04-26 DIAGNOSIS — F02.A0 MILD LATE ONSET ALZHEIMER'S DEMENTIA WITHOUT BEHAVIORAL DISTURBANCE, PSYCHOTIC DISTURBANCE, MOOD DISTURBANCE, OR ANXIETY (MULTI): ICD-10-CM

## 2023-04-26 DIAGNOSIS — R63.0 POOR APPETITE: ICD-10-CM

## 2023-04-26 DIAGNOSIS — G30.1 MILD LATE ONSET ALZHEIMER'S DEMENTIA WITHOUT BEHAVIORAL DISTURBANCE, PSYCHOTIC DISTURBANCE, MOOD DISTURBANCE, OR ANXIETY (MULTI): ICD-10-CM

## 2023-04-26 DIAGNOSIS — R63.4 WEIGHT LOSS: ICD-10-CM

## 2023-04-26 DIAGNOSIS — I10 HYPERTENSION, ESSENTIAL: ICD-10-CM

## 2023-04-26 DIAGNOSIS — R53.1 WEAKNESS: Primary | ICD-10-CM

## 2023-04-26 PROCEDURE — 99309 SBSQ NF CARE MODERATE MDM 30: CPT | Performed by: NURSE PRACTITIONER

## 2023-04-26 NOTE — LETTER
Patient: Gwen Fowler  : 1930    Encounter Date: 2023    PROGRESS NOTE    Subjective  Chief complaint: Gwen Fowler is a 89 y.o. female who is an acute skilled patient being seen and evaluated for multiple concerns    HPI:  3/22/23 patient admitted to SNF for therapy d/t weakness after recent hospitalization s/p fall.  During hospitalization patient had nausea vomiting and abdominal pain.  HIDA scan was performed and showed chronic cholecystitis and patient underwent laparoscopic cholecystectomy.  She had some left knee pain and underwent aspiration to rule out septic arthritis and recommendation was made for further work-up as outpatient.  She was given a left lower extremity immobilizer.  She states that she continues to have pain in multiple joints which is not new.  Pain medication is somewhat effective.  Patient has been working in therapy and is working on transfers.  She requires maximum assist for stand pivot transfer and maximum assist for sit to stand at front wheeled walker.    3/23/23 patient continues to work in therapy and is working on safe transfers.  She requires maximum assist for transfers maximum assist for sit to stand at front wheeled walker.  No new concerns or complaints.  No acute distress.    3/24/2023 patient continues working in therapy.  She requires moderate assist for sit to stand transfers.  She is working in speech therapy as well.  Nurse reporting patient with nausea and vomiting after meals.  She was followed by GI in the hospital and treated with IV Protonix and IV Zofran.  Patient is currently on PPI.  She is having bowel movements according to the nurse.    3/27/23 Patient with Dementia continues to work in speech, physical and occupational therapy. She requires moderate assistance for moderate assistance for transfers. Nurse reports that she has had weight loss and dietician will evaluate.  Presently denies nausea or vomiting, fever, chills or diarrhea. In  addition her BS was low at 42, she takes metformin once daily for DM. BMP was obtained and revealed H&H 7.4 and 12.5. No other concerns today.     3/29/2023 patient is working on transfers in therapy and continues to work toward goals.  She has no new concerns today.  Denies constitutional symptoms.    3/30/23 Patient working in therapy due to weakness and debility. Patient requires moderate assistance for transfers. Denies pain at this time. No acute distress or new concerns. Denies SOB or unexplained weight gain.     3/31/2023 patient with no new concerns today.  She continues to work towards goals in therapy.  Requires wheelchair for mobility.  Denies constitutional symptoms.    4/4/23  patient continues to work in therapy due to weakness.  She is working on strengthening and requires a wheelchair for mobility.  No new issues or concerns.  No acute distress.  Denies shortness of breath.    4/5/23  Patient has been working in therapy to improve strength, endurance, and ADLs.  Patient continues to work toward goals.  No new concerns today.  Denies n/v/f/c pain.      4/7/23 Patient has no new complaints today. Compliant with therapies and requires partial to mod assistance with most activies. She continues to work toward goals.      4/10/23 Patient with UR would like to have lorenz catheter removed and attempt voiding trial. Denies ABD discomfort or burning with urination. Patient requires moderate assistance for transfers and ADL's.     4/12/2023 patient has been working with PT and OT.  She requires minimal to moderate assist for sit to stand transfers.  Patient is able to ambulate 12 feet with front wheeled walker with contact-guard to minimal assist.  Nursing staff reports that patient has had poor appetite weight loss and tearfulness at times.  Patient denies signs symptoms and denies feeling down.  Weight has gone down 24 pounds since November however patient does have chronic cholecystitis.  She denies  abdominal pain nausea vomiting fever chills.  She is followed by dietitian and on supplements.    4/14/23 patient in therapy d/t generalized weakness.  Patient presents for f/u.  Continues to work toward goals in therapy.  No new complaints at this time.  States she feels good    4/17/23  nurse reports patient with increased leg pain.  Patient does have order for lateral Aspercreme which is effective when applied.  Therapy continues to work with patient.  Patient is ambulating over 10 feet with a walker and  contact-guard to minimal assistance.  No new issues or concerns.  No acute distress.   Denies shortness of breath orthopnea.    4/19/23  atient continues to participate in therapy.  No new concerns reported by nursing.  Patient was issued a last covered day of 4/21/2023.  Per social work patient's son is planning to appeal.      4/20/23 Patient working in therapy LCD today. Family appealed and awaiting response. No acute distress.     4/21/2023 Patient continues to work with therapy and is working on bed mobility balance and transfers.  Skilled interventions focused on initiation cues to facilitate skill performance and strengthening activities.  Patient has no new concerns today.  She states she is feeling well.  Denies constitutional symptoms.    4/24/23 Patient working in therapy due to weakness. Patient requires assistance for transfers, ADL's and mobility. No new issues at this time. No acute distress. Denies SOB or orthopnea.    4/25/23  patient continues to work in therapy.  Patient requires contact-guard assist for transfers and ADLs.  Nurse reports patient who has had a recent weight loss has continued poor appetite.  She denies constitutional symptoms.  Denies SOB or orthopnea. No other concerns at this time.  No acute distress.    4/26/2023 patient continues working with therapy.  She is able to ambulate 10 feet with front wheeled walker.  She is working on posture and transfers as well as active  range of motion.  Nursing staff reports patient with poor appetite and weight loss.  Patient is on Remeron.  She denies abdominal pain nausea and vomiting.  Patient states that she feels good.      Objective  Vital signs: 130/75, 97.5, 92, 20, 94%    Physical Exam  Constitutional:       General: She is not in acute distress.  Eyes:      Extraocular Movements: Extraocular movements intact.   Cardiovascular:      Rate and Rhythm: Regular rhythm.   Pulmonary:      Effort: Pulmonary effort is normal.      Breath sounds: Normal breath sounds.   Abdominal:      General: Bowel sounds are normal.      Palpations: Abdomen is soft.   Musculoskeletal:      Cervical back: Neck supple.      Right lower leg: No edema.      Left lower leg: No edema.      Comments: Generalized weakness   Neurological:      Mental Status: She is alert.   Psychiatric:         Mood and Affect: Mood normal.         Behavior: Behavior is cooperative.         Assessment/Plan  Problem List Items Addressed This Visit       Chronic diastolic heart failure (CMS/HCC)     Stable, no sob   Cont diuretic  Monitor weight         Hypertension, essential     Blood pressure at goal  Continue antihypertensives  Continue to monitor blood pressure         Mild late onset Alzheimer's dementia without behavioral disturbance, psychotic disturbance, mood disturbance, or anxiety (CMS/HCC)    Poor appetite     Continue Remeron         Weakness - Primary     Continue with therapy         Weight loss     Dietitian   Continue to monitor weight  Continue supplements  Obtain CMP and CBC          Medications, treatments, and labs reviewed  Continue medications and treatments as listed in [facility]    KAIDEN Banda      Electronically Signed By: KAIDEN Banda   4/26/23  2:28 PM

## 2023-04-26 NOTE — PROGRESS NOTES
PROGRESS NOTE    Subjective   Chief complaint: Gwen Fowler is a 89 y.o. female who is an acute skilled patient being seen and evaluated for weakness    HPI:  3/22/23 patient admitted to SNF for therapy d/t weakness after recent hospitalization s/p fall.  During hospitalization patient had nausea vomiting and abdominal pain.  HIDA scan was performed and showed chronic cholecystitis and patient underwent laparoscopic cholecystectomy.  She had some left knee pain and underwent aspiration to rule out septic arthritis and recommendation was made for further work-up as outpatient.  She was given a left lower extremity immobilizer.  She states that she continues to have pain in multiple joints which is not new.  Pain medication is somewhat effective.  Patient has been working in therapy and is working on transfers.  She requires maximum assist for stand pivot transfer and maximum assist for sit to stand at front wheeled walker.    3/23/23 patient continues to work in therapy and is working on safe transfers.  She requires maximum assist for transfers maximum assist for sit to stand at front wheeled walker.  No new concerns or complaints.  No acute distress.    3/24/2023 patient continues working in therapy.  She requires moderate assist for sit to stand transfers.  She is working in speech therapy as well.  Nurse reporting patient with nausea and vomiting after meals.  She was followed by GI in the hospital and treated with IV Protonix and IV Zofran.  Patient is currently on PPI.  She is having bowel movements according to the nurse.    3/27/23 Patient with Dementia continues to work in speech, physical and occupational therapy. She requires moderate assistance for moderate assistance for transfers. Nurse reports that she has had weight loss and dietician will evaluate.  Presently denies nausea or vomiting, fever, chills or diarrhea. In addition her BS was low at 42, she takes metformin once daily for DM. BMP was  obtained and revealed H&H 7.4 and 12.5. No other concerns today.     3/29/2023 patient is working on transfers in therapy and continues to work toward goals.  She has no new concerns today.  Denies constitutional symptoms.    3/30/23 Patient working in therapy due to weakness and debility. Patient requires moderate assistance for transfers. Denies pain at this time. No acute distress or new concerns. Denies SOB or unexplained weight gain.     3/31/2023 patient with no new concerns today.  She continues to work towards goals in therapy.  Requires wheelchair for mobility.  Denies constitutional symptoms.    4/4/23  patient continues to work in therapy due to weakness.  She is working on strengthening and requires a wheelchair for mobility.  No new issues or concerns.  No acute distress.  Denies shortness of breath.    4/5/23  Patient has been working in therapy to improve strength, endurance, and ADLs.  Patient continues to work toward goals.  No new concerns today.  Denies n/v/f/c pain.      4/7/23 Patient has no new complaints today. Compliant with therapies and requires partial to mod assistance with most activies. She continues to work toward goals.      4/10/23 Patient with UR would like to have lorenz catheter removed and attempt voiding trial. Denies ABD discomfort or burning with urination. Patient requires moderate assistance for transfers and ADL's.     4/12/2023 patient has been working with PT and OT.  She requires minimal to moderate assist for sit to stand transfers.  Patient is able to ambulate 12 feet with front wheeled walker with contact-guard to minimal assist.  Nursing staff reports that patient has had poor appetite weight loss and tearfulness at times.  Patient denies signs symptoms and denies feeling down.  Weight has gone down 24 pounds since November however patient does have chronic cholecystitis.  She denies abdominal pain nausea vomiting fever chills.  She is followed by dietitian and on  supplements.    4/14/23 patient in therapy d/t generalized weakness.  Patient presents for f/u.  Continues to work toward goals in therapy.  No new complaints at this time.  States she feels good    4/17/23  nurse reports patient with increased leg pain.  Patient does have order for lateral Aspercreme which is effective when applied.  Therapy continues to work with patient.  Patient is ambulating over 10 feet with a walker and  contact-guard to minimal assistance.  No new issues or concerns.  No acute distress.   Denies shortness of breath orthopnea.    4/19/23  atient continues to participate in therapy.  No new concerns reported by nursing.  Patient was issued a last covered day of 4/21/2023.  Per social work patient's son is planning to appeal.      4/20/23 Patient working in therapy LCD today. Family appealed and awaiting response. No acute distress.     4/21/2023 Patient continues to work with therapy and is working on bed mobility balance and transfers.  Skilled interventions focused on initiation cues to facilitate skill performance and strengthening activities.  Patient has no new concerns today.  She states she is feeling well.  Denies constitutional symptoms.    4/24/23 Patient working in therapy due to weakness. Patient requires assistance for transfers, ADL's and mobility. No new issues at this time. No acute distress. Denies SOB or orthopnea.    4/25/23  patient continues to work in therapy.  Patient requires contact-guard assist for transfers and ADLs.  Nurse reports patient who has had a recent weight loss has continued poor appetite.  She denies constitutional symptoms.  Denies SOB or orthopnea. No other concerns at this time.  No acute distress.      Objective   Vital signs: 123/76, 97%    Physical Exam  Constitutional:       General: She is not in acute distress.  Eyes:      Extraocular Movements: Extraocular movements intact.   Cardiovascular:      Rate and Rhythm: Normal rate and regular rhythm.    Pulmonary:      Effort: Pulmonary effort is normal.      Breath sounds: Normal breath sounds.   Abdominal:      General: Bowel sounds are normal.      Palpations: Abdomen is soft.   Musculoskeletal:      Cervical back: Neck supple.      Right lower leg: No edema.      Left lower leg: No edema.   Neurological:      Mental Status: She is alert.   Psychiatric:         Mood and Affect: Mood normal.         Behavior: Behavior is cooperative.         Assessment/Plan   Problem List Items Addressed This Visit          Circulatory    Chronic diastolic heart failure (CMS/HCC)     Stable, no sob   Cont Diuretic  Monitor weight            Other    Weakness     therapy           Poor appetite      start Remeron  Monitor intake and weight          Medications, treatments, and labs reviewed  Continue medications and treatments as listed in [facility]    Keely Braden MD    1. Poor appetite        2. Weakness        3. Chronic diastolic heart failure (CMS/HCC)             Scribe Attestation  By signing my name below, IIsabel Scribe   attest that this documentation has been prepared under the direction and in the presence of Keely Braden MD.    Provider Attestation - Scribe documentation  All medical record entries made by the Scribe were at my direction and personally dictated by me. I have reviewed the chart and agree that the record accurately reflects my personal performance of the history, physical exam, discussion and plan.

## 2023-04-26 NOTE — PROGRESS NOTES
PROGRESS NOTE    Subjective   Chief complaint: Gwen Fowler is a 89 y.o. female who is an acute skilled patient being seen and evaluated for multiple concerns    HPI:  3/22/23 patient admitted to SNF for therapy d/t weakness after recent hospitalization s/p fall.  During hospitalization patient had nausea vomiting and abdominal pain.  HIDA scan was performed and showed chronic cholecystitis and patient underwent laparoscopic cholecystectomy.  She had some left knee pain and underwent aspiration to rule out septic arthritis and recommendation was made for further work-up as outpatient.  She was given a left lower extremity immobilizer.  She states that she continues to have pain in multiple joints which is not new.  Pain medication is somewhat effective.  Patient has been working in therapy and is working on transfers.  She requires maximum assist for stand pivot transfer and maximum assist for sit to stand at front wheeled walker.    3/23/23 patient continues to work in therapy and is working on safe transfers.  She requires maximum assist for transfers maximum assist for sit to stand at front wheeled walker.  No new concerns or complaints.  No acute distress.    3/24/2023 patient continues working in therapy.  She requires moderate assist for sit to stand transfers.  She is working in speech therapy as well.  Nurse reporting patient with nausea and vomiting after meals.  She was followed by GI in the hospital and treated with IV Protonix and IV Zofran.  Patient is currently on PPI.  She is having bowel movements according to the nurse.    3/27/23 Patient with Dementia continues to work in speech, physical and occupational therapy. She requires moderate assistance for moderate assistance for transfers. Nurse reports that she has had weight loss and dietician will evaluate.  Presently denies nausea or vomiting, fever, chills or diarrhea. In addition her BS was low at 42, she takes metformin once daily for DM. BMP  was obtained and revealed H&H 7.4 and 12.5. No other concerns today.     3/29/2023 patient is working on transfers in therapy and continues to work toward goals.  She has no new concerns today.  Denies constitutional symptoms.    3/30/23 Patient working in therapy due to weakness and debility. Patient requires moderate assistance for transfers. Denies pain at this time. No acute distress or new concerns. Denies SOB or unexplained weight gain.     3/31/2023 patient with no new concerns today.  She continues to work towards goals in therapy.  Requires wheelchair for mobility.  Denies constitutional symptoms.    4/4/23  patient continues to work in therapy due to weakness.  She is working on strengthening and requires a wheelchair for mobility.  No new issues or concerns.  No acute distress.  Denies shortness of breath.    4/5/23  Patient has been working in therapy to improve strength, endurance, and ADLs.  Patient continues to work toward goals.  No new concerns today.  Denies n/v/f/c pain.      4/7/23 Patient has no new complaints today. Compliant with therapies and requires partial to mod assistance with most activies. She continues to work toward goals.      4/10/23 Patient with UR would like to have lorenz catheter removed and attempt voiding trial. Denies ABD discomfort or burning with urination. Patient requires moderate assistance for transfers and ADL's.     4/12/2023 patient has been working with PT and OT.  She requires minimal to moderate assist for sit to stand transfers.  Patient is able to ambulate 12 feet with front wheeled walker with contact-guard to minimal assist.  Nursing staff reports that patient has had poor appetite weight loss and tearfulness at times.  Patient denies signs symptoms and denies feeling down.  Weight has gone down 24 pounds since November however patient does have chronic cholecystitis.  She denies abdominal pain nausea vomiting fever chills.  She is followed by dietitian and on  supplements.    4/14/23 patient in therapy d/t generalized weakness.  Patient presents for f/u.  Continues to work toward goals in therapy.  No new complaints at this time.  States she feels good    4/17/23  nurse reports patient with increased leg pain.  Patient does have order for lateral Aspercreme which is effective when applied.  Therapy continues to work with patient.  Patient is ambulating over 10 feet with a walker and  contact-guard to minimal assistance.  No new issues or concerns.  No acute distress.   Denies shortness of breath orthopnea.    4/19/23  atient continues to participate in therapy.  No new concerns reported by nursing.  Patient was issued a last covered day of 4/21/2023.  Per social work patient's son is planning to appeal.      4/20/23 Patient working in therapy LCD today. Family appealed and awaiting response. No acute distress.     4/21/2023 Patient continues to work with therapy and is working on bed mobility balance and transfers.  Skilled interventions focused on initiation cues to facilitate skill performance and strengthening activities.  Patient has no new concerns today.  She states she is feeling well.  Denies constitutional symptoms.    4/24/23 Patient working in therapy due to weakness. Patient requires assistance for transfers, ADL's and mobility. No new issues at this time. No acute distress. Denies SOB or orthopnea.    4/25/23  patient continues to work in therapy.  Patient requires contact-guard assist for transfers and ADLs.  Nurse reports patient who has had a recent weight loss has continued poor appetite.  She denies constitutional symptoms.  Denies SOB or orthopnea. No other concerns at this time.  No acute distress.    4/26/2023 patient continues working with therapy.  She is able to ambulate 10 feet with front wheeled walker.  She is working on posture and transfers as well as active range of motion.  Nursing staff reports patient with poor appetite and weight loss.   Patient is on Remeron.  She denies abdominal pain nausea and vomiting.  Patient states that she feels good.      Objective   Vital signs: 130/75, 97.5, 92, 20, 94%    Physical Exam  Constitutional:       General: She is not in acute distress.  Eyes:      Extraocular Movements: Extraocular movements intact.   Cardiovascular:      Rate and Rhythm: Regular rhythm.   Pulmonary:      Effort: Pulmonary effort is normal.      Breath sounds: Normal breath sounds.   Abdominal:      General: Bowel sounds are normal.      Palpations: Abdomen is soft.   Musculoskeletal:      Cervical back: Neck supple.      Right lower leg: No edema.      Left lower leg: No edema.      Comments: Generalized weakness   Neurological:      Mental Status: She is alert.   Psychiatric:         Mood and Affect: Mood normal.         Behavior: Behavior is cooperative.         Assessment/Plan   Problem List Items Addressed This Visit       Chronic diastolic heart failure (CMS/HCC)     Stable, no sob   Cont diuretic  Monitor weight         Hypertension, essential     Blood pressure at goal  Continue antihypertensives  Continue to monitor blood pressure         Mild late onset Alzheimer's dementia without behavioral disturbance, psychotic disturbance, mood disturbance, or anxiety (CMS/HCC)    Poor appetite     Continue Remeron         Weakness - Primary     Continue with therapy         Weight loss     Dietitian   Continue to monitor weight  Continue supplements  Obtain CMP and CBC          Medications, treatments, and labs reviewed  Continue medications and treatments as listed in PCC    Tami Ga, APRN-CNP

## 2023-04-27 ENCOUNTER — NURSING HOME VISIT (OUTPATIENT)
Dept: POST ACUTE CARE | Facility: EXTERNAL LOCATION | Age: 88
End: 2023-04-27
Payer: MEDICARE

## 2023-04-27 DIAGNOSIS — I50.32 CHRONIC DIASTOLIC HEART FAILURE (MULTI): ICD-10-CM

## 2023-04-27 DIAGNOSIS — R53.1 WEAKNESS: ICD-10-CM

## 2023-04-27 PROCEDURE — 99309 SBSQ NF CARE MODERATE MDM 30: CPT | Performed by: INTERNAL MEDICINE

## 2023-04-27 NOTE — PROGRESS NOTES
PROGRESS NOTE    Subjective   Chief complaint: Gwen Fowler is a 89 y.o. female who is an acute skilled patient being seen and evaluated for multiple concerns    HPI:  3/22/23 patient admitted to SNF for therapy d/t weakness after recent hospitalization s/p fall.  During hospitalization patient had nausea vomiting and abdominal pain.  HIDA scan was performed and showed chronic cholecystitis and patient underwent laparoscopic cholecystectomy.  She had some left knee pain and underwent aspiration to rule out septic arthritis and recommendation was made for further work-up as outpatient.  She was given a left lower extremity immobilizer.  She states that she continues to have pain in multiple joints which is not new.  Pain medication is somewhat effective.  Patient has been working in therapy and is working on transfers.  She requires maximum assist for stand pivot transfer and maximum assist for sit to stand at front wheeled walker.    3/23/23 patient continues to work in therapy and is working on safe transfers.  She requires maximum assist for transfers maximum assist for sit to stand at front wheeled walker.  No new concerns or complaints.  No acute distress.    3/24/2023 patient continues working in therapy.  She requires moderate assist for sit to stand transfers.  She is working in speech therapy as well.  Nurse reporting patient with nausea and vomiting after meals.  She was followed by GI in the hospital and treated with IV Protonix and IV Zofran.  Patient is currently on PPI.  She is having bowel movements according to the nurse.    3/27/23 Patient with Dementia continues to work in speech, physical and occupational therapy. She requires moderate assistance for moderate assistance for transfers. Nurse reports that she has had weight loss and dietician will evaluate.  Presently denies nausea or vomiting, fever, chills or diarrhea. In addition her BS was low at 42, she takes metformin once daily for DM. BMP  was obtained and revealed H&H 7.4 and 12.5. No other concerns today.     3/29/2023 patient is working on transfers in therapy and continues to work toward goals.  She has no new concerns today.  Denies constitutional symptoms.    3/30/23 Patient working in therapy due to weakness and debility. Patient requires moderate assistance for transfers. Denies pain at this time. No acute distress or new concerns. Denies SOB or unexplained weight gain.     3/31/2023 patient with no new concerns today.  She continues to work towards goals in therapy.  Requires wheelchair for mobility.  Denies constitutional symptoms.    4/4/23  patient continues to work in therapy due to weakness.  She is working on strengthening and requires a wheelchair for mobility.  No new issues or concerns.  No acute distress.  Denies shortness of breath.    4/5/23  Patient has been working in therapy to improve strength, endurance, and ADLs.  Patient continues to work toward goals.  No new concerns today.  Denies n/v/f/c pain.      4/7/23 Patient has no new complaints today. Compliant with therapies and requires partial to mod assistance with most activies. She continues to work toward goals.      4/10/23 Patient with UR would like to have lorenz catheter removed and attempt voiding trial. Denies ABD discomfort or burning with urination. Patient requires moderate assistance for transfers and ADL's.     4/12/2023 patient has been working with PT and OT.  She requires minimal to moderate assist for sit to stand transfers.  Patient is able to ambulate 12 feet with front wheeled walker with contact-guard to minimal assist.  Nursing staff reports that patient has had poor appetite weight loss and tearfulness at times.  Patient denies signs symptoms and denies feeling down.  Weight has gone down 24 pounds since November however patient does have chronic cholecystitis.  She denies abdominal pain nausea vomiting fever chills.  She is followed by dietitian and on  supplements.    4/14/23 patient in therapy d/t generalized weakness.  Patient presents for f/u.  Continues to work toward goals in therapy.  No new complaints at this time.  States she feels good    4/17/23  nurse reports patient with increased leg pain.  Patient does have order for lateral Aspercreme which is effective when applied.  Therapy continues to work with patient.  Patient is ambulating over 10 feet with a walker and  contact-guard to minimal assistance.  No new issues or concerns.  No acute distress.   Denies shortness of breath orthopnea.    4/19/23  atient continues to participate in therapy.  No new concerns reported by nursing.  Patient was issued a last covered day of 4/21/2023.  Per social work patient's son is planning to appeal.      4/20/23 Patient working in therapy LCD today. Family appealed and awaiting response. No acute distress.     4/21/2023 Patient continues to work with therapy and is working on bed mobility balance and transfers.  Skilled interventions focused on initiation cues to facilitate skill performance and strengthening activities.  Patient has no new concerns today.  She states she is feeling well.  Denies constitutional symptoms.    4/24/23 Patient working in therapy due to weakness. Patient requires assistance for transfers, ADL's and mobility. No new issues at this time. No acute distress. Denies SOB or orthopnea.    4/25/23  patient continues to work in therapy.  Patient requires contact-guard assist for transfers and ADLs.  Nurse reports patient who has had a recent weight loss has continued poor appetite.  She denies constitutional symptoms.  Denies SOB or orthopnea. No other concerns at this time.  No acute distress.    4/26/2023 patient continues working with therapy.  She is able to ambulate 10 feet with front wheeled walker.  She is working on posture and transfers as well as active range of motion.  Nursing staff reports patient with poor appetite and weight loss.   Patient is on Remeron.  She denies abdominal pain nausea and vomiting.  Patient states that she feels good.    4/27/23 Patient is participating in therapy. She is walking over 10' with FWW, weakness improving. No new issues at this time. No acute distress. Denies SOB.       Objective   Vital signs: 134/73, 96%    Physical Exam  Constitutional:       General: She is not in acute distress.  Eyes:      Extraocular Movements: Extraocular movements intact.   Cardiovascular:      Rate and Rhythm: Regular rhythm.   Pulmonary:      Effort: Pulmonary effort is normal.      Breath sounds: Normal breath sounds.   Abdominal:      General: Bowel sounds are normal.      Palpations: Abdomen is soft.   Musculoskeletal:      Cervical back: Neck supple.      Right lower leg: No edema.      Left lower leg: No edema.      Comments: Generalized weakness   Neurological:      Mental Status: She is alert.   Psychiatric:         Mood and Affect: Mood normal.         Behavior: Behavior is cooperative.       1. Chronic diastolic heart failure (CMS/HCC)        2. Weakness           Assessment/Plan   Problem List Items Addressed This Visit          Circulatory    Chronic diastolic heart failure (CMS/HCC)     Stable, no sob   Cont diuretic  Monitor weight            Other    Weakness     Continue with therapy        Medications, treatments, and labs reviewed  Continue medications and treatments as listed in PCC    Keely Braden MD

## 2023-04-27 NOTE — LETTER
Patient: Gwen Fowler  : 1930    Encounter Date: 2023    PROGRESS NOTE    Subjective  Chief complaint: Gwen Fowler is a 89 y.o. female who is an acute skilled patient being seen and evaluated for multiple concerns    HPI:  3/22/23 patient admitted to SNF for therapy d/t weakness after recent hospitalization s/p fall.  During hospitalization patient had nausea vomiting and abdominal pain.  HIDA scan was performed and showed chronic cholecystitis and patient underwent laparoscopic cholecystectomy.  She had some left knee pain and underwent aspiration to rule out septic arthritis and recommendation was made for further work-up as outpatient.  She was given a left lower extremity immobilizer.  She states that she continues to have pain in multiple joints which is not new.  Pain medication is somewhat effective.  Patient has been working in therapy and is working on transfers.  She requires maximum assist for stand pivot transfer and maximum assist for sit to stand at front wheeled walker.    3/23/23 patient continues to work in therapy and is working on safe transfers.  She requires maximum assist for transfers maximum assist for sit to stand at front wheeled walker.  No new concerns or complaints.  No acute distress.    3/24/2023 patient continues working in therapy.  She requires moderate assist for sit to stand transfers.  She is working in speech therapy as well.  Nurse reporting patient with nausea and vomiting after meals.  She was followed by GI in the hospital and treated with IV Protonix and IV Zofran.  Patient is currently on PPI.  She is having bowel movements according to the nurse.    3/27/23 Patient with Dementia continues to work in speech, physical and occupational therapy. She requires moderate assistance for moderate assistance for transfers. Nurse reports that she has had weight loss and dietician will evaluate.  Presently denies nausea or vomiting, fever, chills or diarrhea. In  addition her BS was low at 42, she takes metformin once daily for DM. BMP was obtained and revealed H&H 7.4 and 12.5. No other concerns today.     3/29/2023 patient is working on transfers in therapy and continues to work toward goals.  She has no new concerns today.  Denies constitutional symptoms.    3/30/23 Patient working in therapy due to weakness and debility. Patient requires moderate assistance for transfers. Denies pain at this time. No acute distress or new concerns. Denies SOB or unexplained weight gain.     3/31/2023 patient with no new concerns today.  She continues to work towards goals in therapy.  Requires wheelchair for mobility.  Denies constitutional symptoms.    4/4/23  patient continues to work in therapy due to weakness.  She is working on strengthening and requires a wheelchair for mobility.  No new issues or concerns.  No acute distress.  Denies shortness of breath.    4/5/23  Patient has been working in therapy to improve strength, endurance, and ADLs.  Patient continues to work toward goals.  No new concerns today.  Denies n/v/f/c pain.      4/7/23 Patient has no new complaints today. Compliant with therapies and requires partial to mod assistance with most activies. She continues to work toward goals.      4/10/23 Patient with UR would like to have lorenz catheter removed and attempt voiding trial. Denies ABD discomfort or burning with urination. Patient requires moderate assistance for transfers and ADL's.     4/12/2023 patient has been working with PT and OT.  She requires minimal to moderate assist for sit to stand transfers.  Patient is able to ambulate 12 feet with front wheeled walker with contact-guard to minimal assist.  Nursing staff reports that patient has had poor appetite weight loss and tearfulness at times.  Patient denies signs symptoms and denies feeling down.  Weight has gone down 24 pounds since November however patient does have chronic cholecystitis.  She denies  abdominal pain nausea vomiting fever chills.  She is followed by dietitian and on supplements.    4/14/23 patient in therapy d/t generalized weakness.  Patient presents for f/u.  Continues to work toward goals in therapy.  No new complaints at this time.  States she feels good    4/17/23  nurse reports patient with increased leg pain.  Patient does have order for lateral Aspercreme which is effective when applied.  Therapy continues to work with patient.  Patient is ambulating over 10 feet with a walker and  contact-guard to minimal assistance.  No new issues or concerns.  No acute distress.   Denies shortness of breath orthopnea.    4/19/23  atient continues to participate in therapy.  No new concerns reported by nursing.  Patient was issued a last covered day of 4/21/2023.  Per social work patient's son is planning to appeal.      4/20/23 Patient working in therapy LCD today. Family appealed and awaiting response. No acute distress.     4/21/2023 Patient continues to work with therapy and is working on bed mobility balance and transfers.  Skilled interventions focused on initiation cues to facilitate skill performance and strengthening activities.  Patient has no new concerns today.  She states she is feeling well.  Denies constitutional symptoms.    4/24/23 Patient working in therapy due to weakness. Patient requires assistance for transfers, ADL's and mobility. No new issues at this time. No acute distress. Denies SOB or orthopnea.    4/25/23  patient continues to work in therapy.  Patient requires contact-guard assist for transfers and ADLs.  Nurse reports patient who has had a recent weight loss has continued poor appetite.  She denies constitutional symptoms.  Denies SOB or orthopnea. No other concerns at this time.  No acute distress.    4/26/2023 patient continues working with therapy.  She is able to ambulate 10 feet with front wheeled walker.  She is working on posture and transfers as well as active  range of motion.  Nursing staff reports patient with poor appetite and weight loss.  Patient is on Remeron.  She denies abdominal pain nausea and vomiting.  Patient states that she feels good.    4/27/23 Patient is participating in therapy. She is walking over 10' with FWW, weakness improving. No new issues at this time. No acute distress. Denies SOB.       Objective  Vital signs: 134/73, 96%    Physical Exam  Constitutional:       General: She is not in acute distress.  Eyes:      Extraocular Movements: Extraocular movements intact.   Cardiovascular:      Rate and Rhythm: Regular rhythm.   Pulmonary:      Effort: Pulmonary effort is normal.      Breath sounds: Normal breath sounds.   Abdominal:      General: Bowel sounds are normal.      Palpations: Abdomen is soft.   Musculoskeletal:      Cervical back: Neck supple.      Right lower leg: No edema.      Left lower leg: No edema.      Comments: Generalized weakness   Neurological:      Mental Status: She is alert.   Psychiatric:         Mood and Affect: Mood normal.         Behavior: Behavior is cooperative.       1. Chronic diastolic heart failure (CMS/HCC)        2. Weakness           Assessment/Plan  Problem List Items Addressed This Visit          Circulatory    Chronic diastolic heart failure (CMS/HCC)     Stable, no sob   Cont diuretic  Monitor weight            Other    Weakness     Continue with therapy        Medications, treatments, and labs reviewed  Continue medications and treatments as listed in PCC    Keely Braden MD      Electronically Signed By: Keely Braden MD   4/27/23  5:02 PM

## 2023-04-27 NOTE — ASSESSMENT & PLAN NOTE
Continue with therapy   Detail Level: Detailed General Sunscreen Counseling: I recommended a broad spectrum sunscreen with a SPF of 30 or higher.  I explained that SPF 30 sunscreens block approximately 97 percent of the sun's harmful rays.  Sunscreens should be applied at least 15 minutes prior to expected sun exposure and then every 2 hours after that as long as sun exposure continues. If swimming or exercising sunscreen should be reapplied every 45 minutes to an hour after getting wet or sweating.  One ounce, or the equivalent of a shot glass full of sunscreen, is adequate to protect the skin not covered by a bathing suit. I also recommended a lip balm with a sunscreen as well. Sun protective clothing is also recommended.